# Patient Record
Sex: FEMALE | Race: WHITE | NOT HISPANIC OR LATINO | Employment: OTHER | URBAN - METROPOLITAN AREA
[De-identification: names, ages, dates, MRNs, and addresses within clinical notes are randomized per-mention and may not be internally consistent; named-entity substitution may affect disease eponyms.]

---

## 2017-01-03 ENCOUNTER — COMMUNICATION - HEALTHEAST (OUTPATIENT)
Dept: ONCOLOGY | Facility: CLINIC | Age: 67
End: 2017-01-03

## 2017-01-03 ENCOUNTER — OFFICE VISIT - HEALTHEAST (OUTPATIENT)
Dept: INTERNAL MEDICINE | Facility: CLINIC | Age: 67
End: 2017-01-03

## 2017-01-03 DIAGNOSIS — Z94.84 STEM CELLS TRANSPLANT STATUS (H): ICD-10-CM

## 2017-01-03 DIAGNOSIS — Z78.0 POST-MENOPAUSAL: ICD-10-CM

## 2017-01-03 DIAGNOSIS — I10 ESSENTIAL HYPERTENSION: ICD-10-CM

## 2017-01-03 DIAGNOSIS — E78.5 HLD (HYPERLIPIDEMIA): ICD-10-CM

## 2017-01-03 DIAGNOSIS — I10 HYPERTENSION: ICD-10-CM

## 2017-01-03 DIAGNOSIS — Z12.31 VISIT FOR SCREENING MAMMOGRAM: ICD-10-CM

## 2017-01-03 DIAGNOSIS — C83.390 PRIMARY CNS LYMPHOMA: ICD-10-CM

## 2017-01-04 ENCOUNTER — COMMUNICATION - HEALTHEAST (OUTPATIENT)
Dept: INTERNAL MEDICINE | Facility: CLINIC | Age: 67
End: 2017-01-04

## 2017-01-04 ENCOUNTER — COMMUNICATION - HEALTHEAST (OUTPATIENT)
Dept: ONCOLOGY | Facility: CLINIC | Age: 67
End: 2017-01-04

## 2017-01-06 ENCOUNTER — COMMUNICATION - HEALTHEAST (OUTPATIENT)
Dept: ONCOLOGY | Facility: CLINIC | Age: 67
End: 2017-01-06

## 2017-01-10 ENCOUNTER — RECORDS - HEALTHEAST (OUTPATIENT)
Dept: ADMINISTRATIVE | Facility: OTHER | Age: 67
End: 2017-01-10

## 2017-01-10 ENCOUNTER — HOSPITAL ENCOUNTER (OUTPATIENT)
Dept: MAMMOGRAPHY | Facility: CLINIC | Age: 67
Discharge: HOME | End: 2017-01-10

## 2017-01-10 ENCOUNTER — RECORDS - HEALTHEAST (OUTPATIENT)
Dept: BONE DENSITY | Facility: CLINIC | Age: 67
End: 2017-01-10

## 2017-01-10 DIAGNOSIS — Z12.31 VISIT FOR SCREENING MAMMOGRAM: ICD-10-CM

## 2017-01-10 DIAGNOSIS — Z78.0 ASYMPTOMATIC MENOPAUSAL STATE: ICD-10-CM

## 2017-01-16 ENCOUNTER — COMMUNICATION - HEALTHEAST (OUTPATIENT)
Dept: INTERNAL MEDICINE | Facility: CLINIC | Age: 67
End: 2017-01-16

## 2017-02-07 ENCOUNTER — OFFICE VISIT - HEALTHEAST (OUTPATIENT)
Dept: ONCOLOGY | Facility: CLINIC | Age: 67
End: 2017-02-07

## 2017-02-07 ENCOUNTER — AMBULATORY - HEALTHEAST (OUTPATIENT)
Dept: ONCOLOGY | Facility: CLINIC | Age: 67
End: 2017-02-07

## 2017-02-07 ENCOUNTER — HOSPITAL ENCOUNTER (OUTPATIENT)
Dept: RADIOLOGY | Facility: CLINIC | Age: 67
Discharge: HOME | End: 2017-02-07
Attending: INTERNAL MEDICINE

## 2017-02-07 DIAGNOSIS — C83.390 PRIMARY CNS LYMPHOMA: ICD-10-CM

## 2017-02-07 ASSESSMENT — MIFFLIN-ST. JEOR: SCORE: 1435.08

## 2017-02-14 ENCOUNTER — HOSPITAL ENCOUNTER (OUTPATIENT)
Dept: MRI IMAGING | Facility: CLINIC | Age: 67
Discharge: HOME | End: 2017-02-14
Attending: INTERNAL MEDICINE

## 2017-02-14 ENCOUNTER — INFUSION - HEALTHEAST (OUTPATIENT)
Dept: INFUSION THERAPY | Facility: CLINIC | Age: 67
End: 2017-02-14

## 2017-02-14 DIAGNOSIS — C83.390 PRIMARY CNS LYMPHOMA: ICD-10-CM

## 2017-02-21 ENCOUNTER — OFFICE VISIT - HEALTHEAST (OUTPATIENT)
Dept: VASCULAR SURGERY | Facility: CLINIC | Age: 67
End: 2017-02-21

## 2017-02-21 ENCOUNTER — AMBULATORY - HEALTHEAST (OUTPATIENT)
Dept: ONCOLOGY | Facility: CLINIC | Age: 67
End: 2017-02-21

## 2017-02-21 ENCOUNTER — RECORDS - HEALTHEAST (OUTPATIENT)
Dept: VASCULAR ULTRASOUND | Facility: CLINIC | Age: 67
End: 2017-02-21

## 2017-02-21 DIAGNOSIS — I83.893 SYMPTOMATIC VARICOSE VEINS OF BOTH LOWER EXTREMITIES: ICD-10-CM

## 2017-02-21 DIAGNOSIS — I87.2 VENOUS INSUFFICIENCY OF BOTH LOWER EXTREMITIES: ICD-10-CM

## 2017-02-21 DIAGNOSIS — I83.893 VARICOSE VEINS OF BILATERAL LOWER EXTREMITIES WITH OTHER COMPLICATIONS: ICD-10-CM

## 2017-02-21 DIAGNOSIS — I87.2 VENOUS INSUFFICIENCY (CHRONIC) (PERIPHERAL): ICD-10-CM

## 2017-02-21 ASSESSMENT — MIFFLIN-ST. JEOR: SCORE: 1432.81

## 2017-02-28 ENCOUNTER — AMBULATORY - HEALTHEAST (OUTPATIENT)
Dept: VASCULAR SURGERY | Facility: CLINIC | Age: 67
End: 2017-02-28

## 2017-04-03 ENCOUNTER — COMMUNICATION - HEALTHEAST (OUTPATIENT)
Dept: INTERNAL MEDICINE | Facility: CLINIC | Age: 67
End: 2017-04-03

## 2017-04-04 ENCOUNTER — COMMUNICATION - HEALTHEAST (OUTPATIENT)
Dept: ONCOLOGY | Facility: CLINIC | Age: 67
End: 2017-04-04

## 2017-04-13 ENCOUNTER — COMMUNICATION - HEALTHEAST (OUTPATIENT)
Dept: INTERNAL MEDICINE | Facility: CLINIC | Age: 67
End: 2017-04-13

## 2017-04-13 ENCOUNTER — OFFICE VISIT - HEALTHEAST (OUTPATIENT)
Dept: INTERNAL MEDICINE | Facility: CLINIC | Age: 67
End: 2017-04-13

## 2017-04-13 DIAGNOSIS — N32.81 OAB (OVERACTIVE BLADDER): ICD-10-CM

## 2017-04-13 DIAGNOSIS — M54.50 LOW BACK PAIN: ICD-10-CM

## 2017-04-13 DIAGNOSIS — I87.2 VENOUS INSUFFICIENCY: ICD-10-CM

## 2017-06-06 ENCOUNTER — OFFICE VISIT - HEALTHEAST (OUTPATIENT)
Dept: VASCULAR SURGERY | Facility: CLINIC | Age: 67
End: 2017-06-06

## 2017-06-06 DIAGNOSIS — I87.2 VENOUS INSUFFICIENCY OF BOTH LOWER EXTREMITIES: ICD-10-CM

## 2017-06-06 DIAGNOSIS — I83.893 SYMPTOMATIC VARICOSE VEINS OF BOTH LOWER EXTREMITIES: ICD-10-CM

## 2017-06-12 ENCOUNTER — AMBULATORY - HEALTHEAST (OUTPATIENT)
Dept: VASCULAR SURGERY | Facility: CLINIC | Age: 67
End: 2017-06-12

## 2017-07-10 ENCOUNTER — OFFICE VISIT - HEALTHEAST (OUTPATIENT)
Dept: INTERNAL MEDICINE | Facility: CLINIC | Age: 67
End: 2017-07-10

## 2017-07-10 DIAGNOSIS — I87.2 VENOUS INSUFFICIENCY: ICD-10-CM

## 2017-07-10 DIAGNOSIS — I10 ESSENTIAL HYPERTENSION: ICD-10-CM

## 2017-07-10 DIAGNOSIS — K21.9 ESOPHAGEAL REFLUX: ICD-10-CM

## 2017-07-10 DIAGNOSIS — N32.81 OAB (OVERACTIVE BLADDER): ICD-10-CM

## 2017-07-10 DIAGNOSIS — R05.3 CHRONIC COUGH: ICD-10-CM

## 2017-07-10 DIAGNOSIS — C83.390 PRIMARY CNS LYMPHOMA: ICD-10-CM

## 2017-08-08 ENCOUNTER — OFFICE VISIT - HEALTHEAST (OUTPATIENT)
Dept: INTERNAL MEDICINE | Facility: CLINIC | Age: 67
End: 2017-08-08

## 2017-08-08 ENCOUNTER — OFFICE VISIT - HEALTHEAST (OUTPATIENT)
Dept: ONCOLOGY | Facility: CLINIC | Age: 67
End: 2017-08-08

## 2017-08-08 DIAGNOSIS — N18.2 CHRONIC KIDNEY DISEASE, STAGE II (MILD): ICD-10-CM

## 2017-08-08 DIAGNOSIS — C83.390 PRIMARY CNS LYMPHOMA: ICD-10-CM

## 2017-08-08 DIAGNOSIS — I10 ESSENTIAL HYPERTENSION: ICD-10-CM

## 2017-08-08 DIAGNOSIS — R05.9 COUGH: ICD-10-CM

## 2017-08-09 ENCOUNTER — HOSPITAL ENCOUNTER (OUTPATIENT)
Dept: MRI IMAGING | Facility: CLINIC | Age: 67
Setting detail: RADIATION/ONCOLOGY SERIES
Discharge: STILL ADMITTED OR ADMINISTRATIVE | End: 2017-08-09
Attending: INTERNAL MEDICINE

## 2017-08-09 DIAGNOSIS — C83.390 PRIMARY CNS LYMPHOMA: ICD-10-CM

## 2017-08-11 ENCOUNTER — COMMUNICATION - HEALTHEAST (OUTPATIENT)
Dept: INTERNAL MEDICINE | Facility: CLINIC | Age: 67
End: 2017-08-11

## 2017-12-07 ENCOUNTER — OFFICE VISIT - HEALTHEAST (OUTPATIENT)
Dept: INTERNAL MEDICINE | Facility: CLINIC | Age: 67
End: 2017-12-07

## 2017-12-07 DIAGNOSIS — R05.9 COUGH: ICD-10-CM

## 2017-12-07 DIAGNOSIS — J40 BRONCHITIS: ICD-10-CM

## 2018-02-01 ENCOUNTER — HOSPITAL ENCOUNTER (OUTPATIENT)
Dept: MRI IMAGING | Facility: CLINIC | Age: 68
Setting detail: RADIATION/ONCOLOGY SERIES
Discharge: STILL ADMITTED OR ADMINISTRATIVE | End: 2018-02-01
Attending: INTERNAL MEDICINE

## 2018-02-01 ENCOUNTER — AMBULATORY - HEALTHEAST (OUTPATIENT)
Dept: INFUSION THERAPY | Facility: CLINIC | Age: 68
End: 2018-02-01

## 2018-02-01 DIAGNOSIS — C83.390 PRIMARY CNS LYMPHOMA: ICD-10-CM

## 2018-02-01 LAB
ALBUMIN SERPL-MCNC: 3.7 G/DL (ref 3.5–5)
ALP SERPL-CCNC: 93 U/L (ref 45–120)
ALT SERPL W P-5'-P-CCNC: 14 U/L (ref 0–45)
ANION GAP SERPL CALCULATED.3IONS-SCNC: 12 MMOL/L (ref 5–18)
AST SERPL W P-5'-P-CCNC: 18 U/L (ref 0–40)
BASOPHILS # BLD AUTO: 0 THOU/UL (ref 0–0.2)
BASOPHILS NFR BLD AUTO: 1 % (ref 0–2)
BILIRUB SERPL-MCNC: 0.5 MG/DL (ref 0–1)
BUN SERPL-MCNC: 22 MG/DL (ref 8–22)
CALCIUM SERPL-MCNC: 9.7 MG/DL (ref 8.5–10.5)
CHLORIDE BLD-SCNC: 102 MMOL/L (ref 98–107)
CO2 SERPL-SCNC: 29 MMOL/L (ref 22–31)
CREAT SERPL-MCNC: 1.2 MG/DL (ref 0.6–1.1)
EOSINOPHIL # BLD AUTO: 0.1 THOU/UL (ref 0–0.4)
EOSINOPHIL NFR BLD AUTO: 2 % (ref 0–6)
ERYTHROCYTE [DISTWIDTH] IN BLOOD BY AUTOMATED COUNT: 13.2 % (ref 11–14.5)
GFR SERPL CREATININE-BSD FRML MDRD: 45 ML/MIN/1.73M2
GLUCOSE BLD-MCNC: 120 MG/DL (ref 70–125)
HCT VFR BLD AUTO: 43.4 % (ref 35–47)
HGB BLD-MCNC: 14.2 G/DL (ref 12–16)
LYMPHOCYTES # BLD AUTO: 1.3 THOU/UL (ref 0.8–4.4)
LYMPHOCYTES NFR BLD AUTO: 18 % (ref 20–40)
MCH RBC QN AUTO: 30.7 PG (ref 27–34)
MCHC RBC AUTO-ENTMCNC: 32.7 G/DL (ref 32–36)
MCV RBC AUTO: 94 FL (ref 80–100)
MONOCYTES # BLD AUTO: 0.4 THOU/UL (ref 0–0.9)
MONOCYTES NFR BLD AUTO: 5 % (ref 2–10)
NEUTROPHILS # BLD AUTO: 5.4 THOU/UL (ref 2–7.7)
NEUTROPHILS NFR BLD AUTO: 74 % (ref 50–70)
PLATELET # BLD AUTO: 322 THOU/UL (ref 140–440)
PMV BLD AUTO: 9.3 FL (ref 8.5–12.5)
POTASSIUM BLD-SCNC: 3.6 MMOL/L (ref 3.5–5)
PROT SERPL-MCNC: 7.9 G/DL (ref 6–8)
RBC # BLD AUTO: 4.63 MILL/UL (ref 3.8–5.4)
SODIUM SERPL-SCNC: 143 MMOL/L (ref 136–145)
WBC: 7.3 THOU/UL (ref 4–11)

## 2018-02-06 ENCOUNTER — OFFICE VISIT - HEALTHEAST (OUTPATIENT)
Dept: ONCOLOGY | Facility: CLINIC | Age: 68
End: 2018-02-06

## 2018-02-06 DIAGNOSIS — C83.390 PRIMARY CNS LYMPHOMA: ICD-10-CM

## 2018-02-06 ASSESSMENT — MIFFLIN-ST. JEOR: SCORE: 1410.13

## 2018-02-08 ENCOUNTER — OFFICE VISIT - HEALTHEAST (OUTPATIENT)
Dept: INTERNAL MEDICINE | Facility: CLINIC | Age: 68
End: 2018-02-08

## 2018-02-08 DIAGNOSIS — I10 ESSENTIAL HYPERTENSION: ICD-10-CM

## 2018-02-08 DIAGNOSIS — E78.5 HLD (HYPERLIPIDEMIA): ICD-10-CM

## 2018-02-08 DIAGNOSIS — C83.390 PRIMARY CNS LYMPHOMA: ICD-10-CM

## 2018-02-08 DIAGNOSIS — I87.2 VENOUS INSUFFICIENCY: ICD-10-CM

## 2018-02-08 LAB
CHOLEST SERPL-MCNC: 182 MG/DL
FASTING STATUS PATIENT QL REPORTED: YES
HDLC SERPL-MCNC: 60 MG/DL
LDLC SERPL CALC-MCNC: 102 MG/DL
TRIGL SERPL-MCNC: 101 MG/DL

## 2018-02-12 ENCOUNTER — COMMUNICATION - HEALTHEAST (OUTPATIENT)
Dept: ONCOLOGY | Facility: CLINIC | Age: 68
End: 2018-02-12

## 2018-02-14 ENCOUNTER — COMMUNICATION - HEALTHEAST (OUTPATIENT)
Dept: INTERNAL MEDICINE | Facility: CLINIC | Age: 68
End: 2018-02-14

## 2018-02-14 LAB — GLUCOSE BLD-MCNC: 87 MG/DL (ref 70–125)

## 2018-05-24 ENCOUNTER — OFFICE VISIT - HEALTHEAST (OUTPATIENT)
Dept: INTERNAL MEDICINE | Facility: CLINIC | Age: 68
End: 2018-05-24

## 2018-05-24 DIAGNOSIS — J32.9 RHINOSINUSITIS: ICD-10-CM

## 2018-05-24 DIAGNOSIS — J40 BRONCHITIS: ICD-10-CM

## 2018-06-05 ENCOUNTER — COMMUNICATION - HEALTHEAST (OUTPATIENT)
Dept: VASCULAR SURGERY | Facility: CLINIC | Age: 68
End: 2018-06-05

## 2018-08-02 ENCOUNTER — AMBULATORY - HEALTHEAST (OUTPATIENT)
Dept: INFUSION THERAPY | Facility: CLINIC | Age: 68
End: 2018-08-02

## 2018-08-02 ENCOUNTER — HOSPITAL ENCOUNTER (OUTPATIENT)
Dept: MRI IMAGING | Facility: CLINIC | Age: 68
Discharge: HOME | End: 2018-08-02
Attending: INTERNAL MEDICINE

## 2018-08-02 DIAGNOSIS — C83.390 PRIMARY CNS LYMPHOMA: ICD-10-CM

## 2018-08-02 LAB
ALBUMIN SERPL-MCNC: 3.6 G/DL (ref 3.5–5)
ALP SERPL-CCNC: 96 U/L (ref 45–120)
ALT SERPL W P-5'-P-CCNC: 15 U/L (ref 0–45)
ANION GAP SERPL CALCULATED.3IONS-SCNC: 8 MMOL/L (ref 5–18)
AST SERPL W P-5'-P-CCNC: 20 U/L (ref 0–40)
BASOPHILS # BLD AUTO: 0 THOU/UL (ref 0–0.2)
BASOPHILS NFR BLD AUTO: 0 % (ref 0–2)
BILIRUB SERPL-MCNC: 0.6 MG/DL (ref 0–1)
BUN SERPL-MCNC: 22 MG/DL (ref 8–28)
CALCIUM SERPL-MCNC: 10.3 MG/DL (ref 8.5–10.5)
CHLORIDE BLD-SCNC: 101 MMOL/L (ref 98–107)
CO2 SERPL-SCNC: 32 MMOL/L (ref 22–31)
CREAT SERPL-MCNC: 1.24 MG/DL (ref 0.6–1.1)
EOSINOPHIL # BLD AUTO: 0.2 THOU/UL (ref 0–0.4)
EOSINOPHIL NFR BLD AUTO: 3 % (ref 0–6)
ERYTHROCYTE [DISTWIDTH] IN BLOOD BY AUTOMATED COUNT: 13.3 % (ref 11–14.5)
GFR SERPL CREATININE-BSD FRML MDRD: 43 ML/MIN/1.73M2
GLUCOSE BLD-MCNC: 110 MG/DL (ref 70–125)
HCT VFR BLD AUTO: 38.1 % (ref 35–47)
HGB BLD-MCNC: 12.5 G/DL (ref 12–16)
LYMPHOCYTES # BLD AUTO: 1.4 THOU/UL (ref 0.8–4.4)
LYMPHOCYTES NFR BLD AUTO: 20 % (ref 20–40)
MCH RBC QN AUTO: 31.1 PG (ref 27–34)
MCHC RBC AUTO-ENTMCNC: 32.8 G/DL (ref 32–36)
MCV RBC AUTO: 95 FL (ref 80–100)
MONOCYTES # BLD AUTO: 0.4 THOU/UL (ref 0–0.9)
MONOCYTES NFR BLD AUTO: 6 % (ref 2–10)
NEUTROPHILS # BLD AUTO: 5 THOU/UL (ref 2–7.7)
NEUTROPHILS NFR BLD AUTO: 71 % (ref 50–70)
PLATELET # BLD AUTO: 290 THOU/UL (ref 140–440)
PMV BLD AUTO: 8.8 FL (ref 8.5–12.5)
POTASSIUM BLD-SCNC: 3.7 MMOL/L (ref 3.5–5)
PROT SERPL-MCNC: 7.3 G/DL (ref 6–8)
RBC # BLD AUTO: 4.02 MILL/UL (ref 3.8–5.4)
SODIUM SERPL-SCNC: 141 MMOL/L (ref 136–145)
WBC: 7 THOU/UL (ref 4–11)

## 2018-08-07 ENCOUNTER — OFFICE VISIT - HEALTHEAST (OUTPATIENT)
Dept: ONCOLOGY | Facility: CLINIC | Age: 68
End: 2018-08-07

## 2018-08-07 DIAGNOSIS — C83.390 PRIMARY CNS LYMPHOMA: ICD-10-CM

## 2018-08-07 ASSESSMENT — MIFFLIN-ST. JEOR: SCORE: 1421.47

## 2018-08-08 ENCOUNTER — OFFICE VISIT - HEALTHEAST (OUTPATIENT)
Dept: INTERNAL MEDICINE | Facility: CLINIC | Age: 68
End: 2018-08-08

## 2018-08-08 DIAGNOSIS — C83.390 PRIMARY CNS LYMPHOMA: ICD-10-CM

## 2018-08-08 DIAGNOSIS — I87.2 VENOUS INSUFFICIENCY: ICD-10-CM

## 2018-08-08 DIAGNOSIS — N32.81 OAB (OVERACTIVE BLADDER): ICD-10-CM

## 2018-08-08 DIAGNOSIS — I10 ESSENTIAL HYPERTENSION: ICD-10-CM

## 2018-08-08 DIAGNOSIS — E78.5 HLD (HYPERLIPIDEMIA): ICD-10-CM

## 2018-09-04 ENCOUNTER — COMMUNICATION - HEALTHEAST (OUTPATIENT)
Dept: INTERNAL MEDICINE | Facility: CLINIC | Age: 68
End: 2018-09-04

## 2018-09-04 ENCOUNTER — RECORDS - HEALTHEAST (OUTPATIENT)
Dept: ADMINISTRATIVE | Facility: OTHER | Age: 68
End: 2018-09-04

## 2018-09-12 ENCOUNTER — AMBULATORY - HEALTHEAST (OUTPATIENT)
Dept: VASCULAR SURGERY | Facility: CLINIC | Age: 68
End: 2018-09-12

## 2018-09-13 ENCOUNTER — AMBULATORY - HEALTHEAST (OUTPATIENT)
Dept: VASCULAR SURGERY | Facility: CLINIC | Age: 68
End: 2018-09-13

## 2018-09-13 DIAGNOSIS — I83.892 SYMPTOMATIC VARICOSE VEINS OF LEFT LOWER EXTREMITY: ICD-10-CM

## 2018-09-13 DIAGNOSIS — I87.2 VENOUS INSUFFICIENCY OF LEFT LOWER EXTREMITY: ICD-10-CM

## 2018-09-14 ENCOUNTER — COMMUNICATION - HEALTHEAST (OUTPATIENT)
Dept: VASCULAR SURGERY | Facility: CLINIC | Age: 68
End: 2018-09-14

## 2018-09-18 ENCOUNTER — OFFICE VISIT - HEALTHEAST (OUTPATIENT)
Dept: INTERNAL MEDICINE | Facility: CLINIC | Age: 68
End: 2018-09-18

## 2018-09-18 DIAGNOSIS — I10 ESSENTIAL HYPERTENSION: ICD-10-CM

## 2018-09-18 DIAGNOSIS — I87.2 VENOUS INSUFFICIENCY: ICD-10-CM

## 2018-09-18 DIAGNOSIS — C83.390 PRIMARY CNS LYMPHOMA: ICD-10-CM

## 2018-09-18 DIAGNOSIS — N18.2 CHRONIC KIDNEY DISEASE, STAGE II (MILD): ICD-10-CM

## 2018-09-18 DIAGNOSIS — J44.9 COPD (CHRONIC OBSTRUCTIVE PULMONARY DISEASE) (H): ICD-10-CM

## 2018-09-18 DIAGNOSIS — H35.341 MACULAR HOLE OF RIGHT EYE: ICD-10-CM

## 2018-09-18 DIAGNOSIS — E78.5 HLD (HYPERLIPIDEMIA): ICD-10-CM

## 2018-09-18 DIAGNOSIS — Z01.818 PREOPERATIVE EXAMINATION: ICD-10-CM

## 2018-09-18 DIAGNOSIS — K21.9 ESOPHAGEAL REFLUX: ICD-10-CM

## 2018-09-18 ASSESSMENT — MIFFLIN-ST. JEOR: SCORE: 1410.13

## 2018-10-29 ENCOUNTER — RECORDS - HEALTHEAST (OUTPATIENT)
Dept: ADMINISTRATIVE | Facility: OTHER | Age: 68
End: 2018-10-29

## 2018-11-06 ENCOUNTER — COMMUNICATION - HEALTHEAST (OUTPATIENT)
Dept: VASCULAR SURGERY | Facility: CLINIC | Age: 68
End: 2018-11-06

## 2019-01-21 ENCOUNTER — RECORDS - HEALTHEAST (OUTPATIENT)
Dept: ADMINISTRATIVE | Facility: OTHER | Age: 69
End: 2019-01-21

## 2019-02-07 ENCOUNTER — HOSPITAL ENCOUNTER (OUTPATIENT)
Dept: MRI IMAGING | Facility: CLINIC | Age: 69
Setting detail: RADIATION/ONCOLOGY SERIES
Discharge: STILL ADMITTED OR ADMINISTRATIVE | End: 2019-02-07
Attending: INTERNAL MEDICINE

## 2019-02-07 ENCOUNTER — COMMUNICATION - HEALTHEAST (OUTPATIENT)
Dept: INTERNAL MEDICINE | Facility: CLINIC | Age: 69
End: 2019-02-07

## 2019-02-07 DIAGNOSIS — C83.390 PRIMARY CNS LYMPHOMA: ICD-10-CM

## 2019-02-07 DIAGNOSIS — I10 ESSENTIAL HYPERTENSION: ICD-10-CM

## 2019-02-07 LAB — CREAT BLD-MCNC: 1.1 MG/DL

## 2019-02-12 ENCOUNTER — OFFICE VISIT - HEALTHEAST (OUTPATIENT)
Dept: ONCOLOGY | Facility: CLINIC | Age: 69
End: 2019-02-12

## 2019-02-12 DIAGNOSIS — C83.390 PRIMARY CNS LYMPHOMA: ICD-10-CM

## 2019-02-14 ENCOUNTER — OFFICE VISIT - HEALTHEAST (OUTPATIENT)
Dept: INTERNAL MEDICINE | Facility: CLINIC | Age: 69
End: 2019-02-14

## 2019-02-14 ENCOUNTER — AMBULATORY - HEALTHEAST (OUTPATIENT)
Dept: LAB | Facility: CLINIC | Age: 69
End: 2019-02-14

## 2019-02-14 DIAGNOSIS — R05.9 COUGH: ICD-10-CM

## 2019-02-14 DIAGNOSIS — C83.390 PRIMARY CNS LYMPHOMA: ICD-10-CM

## 2019-02-14 DIAGNOSIS — M25.512 ACUTE PAIN OF LEFT SHOULDER: ICD-10-CM

## 2019-02-14 DIAGNOSIS — I51.7 HEART ENLARGED: ICD-10-CM

## 2019-02-14 DIAGNOSIS — E78.2 MIXED HYPERLIPIDEMIA: ICD-10-CM

## 2019-02-14 LAB
ALBUMIN SERPL-MCNC: 3.8 G/DL (ref 3.5–5)
ALP SERPL-CCNC: 92 U/L (ref 45–120)
ALT SERPL W P-5'-P-CCNC: 12 U/L (ref 0–45)
ANION GAP SERPL CALCULATED.3IONS-SCNC: 9 MMOL/L (ref 5–18)
AST SERPL W P-5'-P-CCNC: 20 U/L (ref 0–40)
BASOPHILS # BLD AUTO: 0 THOU/UL (ref 0–0.2)
BASOPHILS NFR BLD AUTO: 0 % (ref 0–2)
BILIRUB SERPL-MCNC: 0.5 MG/DL (ref 0–1)
BNP SERPL-MCNC: 44 PG/ML (ref 0–120)
BUN SERPL-MCNC: 29 MG/DL (ref 8–28)
CALCIUM SERPL-MCNC: 9.9 MG/DL (ref 8.5–10.5)
CHLORIDE BLD-SCNC: 102 MMOL/L (ref 98–107)
CHOLEST SERPL-MCNC: 184 MG/DL
CO2 SERPL-SCNC: 29 MMOL/L (ref 22–31)
CREAT SERPL-MCNC: 1.22 MG/DL (ref 0.6–1.1)
EOSINOPHIL # BLD AUTO: 0.2 THOU/UL (ref 0–0.4)
EOSINOPHIL NFR BLD AUTO: 3 % (ref 0–6)
ERYTHROCYTE [DISTWIDTH] IN BLOOD BY AUTOMATED COUNT: 11.4 % (ref 11–14.5)
FASTING STATUS PATIENT QL REPORTED: NORMAL
GFR SERPL CREATININE-BSD FRML MDRD: 44 ML/MIN/1.73M2
GLUCOSE BLD-MCNC: 99 MG/DL (ref 70–125)
HCT VFR BLD AUTO: 32.1 % (ref 35–47)
HDLC SERPL-MCNC: 63 MG/DL
HGB BLD-MCNC: 10.7 G/DL (ref 12–16)
LDH SERPL L TO P-CCNC: 152 U/L (ref 125–220)
LDLC SERPL CALC-MCNC: 98 MG/DL
LYMPHOCYTES # BLD AUTO: 1.3 THOU/UL (ref 0.8–4.4)
LYMPHOCYTES NFR BLD AUTO: 18 % (ref 20–40)
MCH RBC QN AUTO: 28.6 PG (ref 27–34)
MCHC RBC AUTO-ENTMCNC: 33.4 G/DL (ref 32–36)
MCV RBC AUTO: 86 FL (ref 80–100)
MONOCYTES # BLD AUTO: 0.5 THOU/UL (ref 0–0.9)
MONOCYTES NFR BLD AUTO: 7 % (ref 2–10)
NEUTROPHILS # BLD AUTO: 5.3 THOU/UL (ref 2–7.7)
NEUTROPHILS NFR BLD AUTO: 72 % (ref 50–70)
PLATELET # BLD AUTO: 428 THOU/UL (ref 140–440)
PMV BLD AUTO: 6.4 FL (ref 7–10)
POTASSIUM BLD-SCNC: 4.1 MMOL/L (ref 3.5–5)
PROT SERPL-MCNC: 7 G/DL (ref 6–8)
RBC # BLD AUTO: 3.75 MILL/UL (ref 3.8–5.4)
SODIUM SERPL-SCNC: 140 MMOL/L (ref 136–145)
TRIGL SERPL-MCNC: 113 MG/DL
WBC: 7.4 THOU/UL (ref 4–11)

## 2019-02-15 ENCOUNTER — COMMUNICATION - HEALTHEAST (OUTPATIENT)
Dept: INTERNAL MEDICINE | Facility: CLINIC | Age: 69
End: 2019-02-15

## 2019-02-15 ENCOUNTER — AMBULATORY - HEALTHEAST (OUTPATIENT)
Dept: INTERNAL MEDICINE | Facility: CLINIC | Age: 69
End: 2019-02-15

## 2019-02-25 ENCOUNTER — OFFICE VISIT - HEALTHEAST (OUTPATIENT)
Dept: PHYSICAL THERAPY | Facility: REHABILITATION | Age: 69
End: 2019-02-25

## 2019-02-25 DIAGNOSIS — G89.29 CHRONIC LEFT SHOULDER PAIN: ICD-10-CM

## 2019-02-25 DIAGNOSIS — M25.512 CHRONIC LEFT SHOULDER PAIN: ICD-10-CM

## 2019-02-25 DIAGNOSIS — M62.81 GENERALIZED MUSCLE WEAKNESS: ICD-10-CM

## 2019-03-01 ENCOUNTER — OFFICE VISIT - HEALTHEAST (OUTPATIENT)
Dept: PHYSICAL THERAPY | Facility: REHABILITATION | Age: 69
End: 2019-03-01

## 2019-03-01 DIAGNOSIS — M25.512 CHRONIC LEFT SHOULDER PAIN: ICD-10-CM

## 2019-03-01 DIAGNOSIS — M62.81 GENERALIZED MUSCLE WEAKNESS: ICD-10-CM

## 2019-03-01 DIAGNOSIS — G89.29 CHRONIC LEFT SHOULDER PAIN: ICD-10-CM

## 2019-03-08 ENCOUNTER — OFFICE VISIT - HEALTHEAST (OUTPATIENT)
Dept: PHYSICAL THERAPY | Facility: REHABILITATION | Age: 69
End: 2019-03-08

## 2019-03-08 DIAGNOSIS — M25.512 CHRONIC LEFT SHOULDER PAIN: ICD-10-CM

## 2019-03-08 DIAGNOSIS — M62.81 GENERALIZED MUSCLE WEAKNESS: ICD-10-CM

## 2019-03-08 DIAGNOSIS — G89.29 CHRONIC LEFT SHOULDER PAIN: ICD-10-CM

## 2019-03-18 ENCOUNTER — OFFICE VISIT - HEALTHEAST (OUTPATIENT)
Dept: PHYSICAL THERAPY | Facility: REHABILITATION | Age: 69
End: 2019-03-18

## 2019-03-18 DIAGNOSIS — M25.512 CHRONIC LEFT SHOULDER PAIN: ICD-10-CM

## 2019-03-18 DIAGNOSIS — G89.29 CHRONIC LEFT SHOULDER PAIN: ICD-10-CM

## 2019-03-18 DIAGNOSIS — M62.81 GENERALIZED MUSCLE WEAKNESS: ICD-10-CM

## 2019-03-22 ENCOUNTER — COMMUNICATION - HEALTHEAST (OUTPATIENT)
Dept: INTERNAL MEDICINE | Facility: CLINIC | Age: 69
End: 2019-03-22

## 2019-03-22 DIAGNOSIS — E78.5 HLD (HYPERLIPIDEMIA): ICD-10-CM

## 2019-03-22 DIAGNOSIS — I10 ESSENTIAL HYPERTENSION: ICD-10-CM

## 2019-03-22 DIAGNOSIS — K21.9 ESOPHAGEAL REFLUX: ICD-10-CM

## 2019-06-18 ENCOUNTER — COMMUNICATION - HEALTHEAST (OUTPATIENT)
Dept: SCHEDULING | Facility: CLINIC | Age: 69
End: 2019-06-18

## 2019-06-18 ENCOUNTER — OFFICE VISIT - HEALTHEAST (OUTPATIENT)
Dept: FAMILY MEDICINE | Facility: CLINIC | Age: 69
End: 2019-06-18

## 2019-06-18 DIAGNOSIS — J32.0 LEFT MAXILLARY SINUSITIS: ICD-10-CM

## 2019-06-25 ENCOUNTER — OFFICE VISIT - HEALTHEAST (OUTPATIENT)
Dept: FAMILY MEDICINE | Facility: CLINIC | Age: 69
End: 2019-06-25

## 2019-06-25 DIAGNOSIS — Z12.31 VISIT FOR SCREENING MAMMOGRAM: ICD-10-CM

## 2019-06-25 DIAGNOSIS — K13.79 LUMP IN MOUTH: ICD-10-CM

## 2019-07-04 ENCOUNTER — OFFICE VISIT - HEALTHEAST (OUTPATIENT)
Dept: FAMILY MEDICINE | Facility: CLINIC | Age: 69
End: 2019-07-04

## 2019-07-04 ENCOUNTER — HOSPITAL ENCOUNTER (OUTPATIENT)
Dept: CT IMAGING | Facility: HOSPITAL | Age: 69
Discharge: HOME | End: 2019-07-04

## 2019-07-04 DIAGNOSIS — K04.7 DENTAL ABSCESS: ICD-10-CM

## 2019-07-04 DIAGNOSIS — J34.89 SINUS PAIN: ICD-10-CM

## 2019-08-12 ENCOUNTER — HOSPITAL ENCOUNTER (OUTPATIENT)
Dept: MRI IMAGING | Facility: CLINIC | Age: 69
Setting detail: RADIATION/ONCOLOGY SERIES
Discharge: STILL ADMITTED OR ADMINISTRATIVE | End: 2019-08-12
Attending: INTERNAL MEDICINE

## 2019-08-12 DIAGNOSIS — C83.390 PRIMARY CNS LYMPHOMA: ICD-10-CM

## 2019-08-12 LAB
CREAT BLD-MCNC: 1.5 MG/DL (ref 0.6–1.1)
GFR SERPL CREATININE-BSD FRML MDRD: 34 ML/MIN/1.73M2

## 2019-08-15 ENCOUNTER — AMBULATORY - HEALTHEAST (OUTPATIENT)
Dept: INFUSION THERAPY | Facility: CLINIC | Age: 69
End: 2019-08-15

## 2019-08-15 ENCOUNTER — OFFICE VISIT - HEALTHEAST (OUTPATIENT)
Dept: ONCOLOGY | Facility: CLINIC | Age: 69
End: 2019-08-15

## 2019-08-15 DIAGNOSIS — D50.0 IRON DEFICIENCY ANEMIA DUE TO CHRONIC BLOOD LOSS: ICD-10-CM

## 2019-08-15 DIAGNOSIS — C83.390 PRIMARY CNS LYMPHOMA: ICD-10-CM

## 2019-08-15 LAB
ALBUMIN SERPL-MCNC: 3.6 G/DL (ref 3.5–5)
ALP SERPL-CCNC: 104 U/L (ref 45–120)
ALT SERPL W P-5'-P-CCNC: 10 U/L (ref 0–45)
ANION GAP SERPL CALCULATED.3IONS-SCNC: 10 MMOL/L (ref 5–18)
AST SERPL W P-5'-P-CCNC: 16 U/L (ref 0–40)
BASOPHILS # BLD AUTO: 0 THOU/UL (ref 0–0.2)
BASOPHILS NFR BLD AUTO: 1 % (ref 0–2)
BILIRUB SERPL-MCNC: 0.3 MG/DL (ref 0–1)
BUN SERPL-MCNC: 23 MG/DL (ref 8–28)
CALCIUM SERPL-MCNC: 9.8 MG/DL (ref 8.5–10.5)
CHLORIDE BLD-SCNC: 103 MMOL/L (ref 98–107)
CO2 SERPL-SCNC: 26 MMOL/L (ref 22–31)
CREAT SERPL-MCNC: 1.63 MG/DL (ref 0.6–1.1)
EOSINOPHIL # BLD AUTO: 0.2 THOU/UL (ref 0–0.4)
EOSINOPHIL NFR BLD AUTO: 2 % (ref 0–6)
ERYTHROCYTE [DISTWIDTH] IN BLOOD BY AUTOMATED COUNT: 14.1 % (ref 11–14.5)
FERRITIN SERPL-MCNC: 7 NG/ML (ref 10–130)
GFR SERPL CREATININE-BSD FRML MDRD: 31 ML/MIN/1.73M2
GLUCOSE BLD-MCNC: 147 MG/DL (ref 70–125)
HCT VFR BLD AUTO: 28.8 % (ref 35–47)
HGB BLD-MCNC: 8.8 G/DL (ref 12–16)
IRON SATN MFR SERPL: 43 % (ref 20–50)
IRON SERPL-MCNC: 199 UG/DL (ref 42–175)
LDH SERPL L TO P-CCNC: 162 U/L (ref 125–220)
LYMPHOCYTES # BLD AUTO: 1.2 THOU/UL (ref 0.8–4.4)
LYMPHOCYTES NFR BLD AUTO: 19 % (ref 20–40)
MCH RBC QN AUTO: 26.7 PG (ref 27–34)
MCHC RBC AUTO-ENTMCNC: 30.6 G/DL (ref 32–36)
MCV RBC AUTO: 87 FL (ref 80–100)
MONOCYTES # BLD AUTO: 0.3 THOU/UL (ref 0–0.9)
MONOCYTES NFR BLD AUTO: 5 % (ref 2–10)
NEUTROPHILS # BLD AUTO: 4.9 THOU/UL (ref 2–7.7)
NEUTROPHILS NFR BLD AUTO: 74 % (ref 50–70)
PLATELET # BLD AUTO: 438 THOU/UL (ref 140–440)
PMV BLD AUTO: 8.6 FL (ref 8.5–12.5)
POTASSIUM BLD-SCNC: 3 MMOL/L (ref 3.5–5)
PROT SERPL-MCNC: 7.3 G/DL (ref 6–8)
RBC # BLD AUTO: 3.3 MILL/UL (ref 3.8–5.4)
SODIUM SERPL-SCNC: 139 MMOL/L (ref 136–145)
TIBC SERPL-MCNC: 461 UG/DL (ref 313–563)
TRANSFERRIN SERPL-MCNC: 368 MG/DL (ref 212–360)
VIT B12 SERPL-MCNC: 274 PG/ML (ref 213–816)
WBC: 6.6 THOU/UL (ref 4–11)

## 2019-08-16 ENCOUNTER — COMMUNICATION - HEALTHEAST (OUTPATIENT)
Dept: ONCOLOGY | Facility: HOSPITAL | Age: 69
End: 2019-08-16

## 2019-08-16 ENCOUNTER — AMBULATORY - HEALTHEAST (OUTPATIENT)
Dept: ONCOLOGY | Facility: CLINIC | Age: 69
End: 2019-08-16

## 2019-08-16 DIAGNOSIS — E61.1 IRON DEFICIENCY: ICD-10-CM

## 2019-08-16 DIAGNOSIS — T50.905S ADVERSE EFFECT OF DRUG OR MEDICAMENT, SEQUELA: ICD-10-CM

## 2019-08-19 ENCOUNTER — AMBULATORY - HEALTHEAST (OUTPATIENT)
Dept: ONCOLOGY | Facility: CLINIC | Age: 69
End: 2019-08-19

## 2019-08-19 DIAGNOSIS — N18.30 CKD (CHRONIC KIDNEY DISEASE) STAGE 3, GFR 30-59 ML/MIN (H): ICD-10-CM

## 2019-08-20 ENCOUNTER — INFUSION - HEALTHEAST (OUTPATIENT)
Dept: INFUSION THERAPY | Facility: CLINIC | Age: 69
End: 2019-08-20

## 2019-08-20 DIAGNOSIS — E61.1 IRON DEFICIENCY: ICD-10-CM

## 2019-08-20 DIAGNOSIS — N18.30 CKD (CHRONIC KIDNEY DISEASE) STAGE 3, GFR 30-59 ML/MIN (H): ICD-10-CM

## 2019-08-20 DIAGNOSIS — T50.905S ADVERSE EFFECT OF DRUG OR MEDICAMENT, SEQUELA: ICD-10-CM

## 2019-08-20 DIAGNOSIS — D50.0 BLOOD LOSS ANEMIA: ICD-10-CM

## 2019-08-27 ENCOUNTER — INFUSION - HEALTHEAST (OUTPATIENT)
Dept: INFUSION THERAPY | Facility: CLINIC | Age: 69
End: 2019-08-27

## 2019-08-27 DIAGNOSIS — E61.1 IRON DEFICIENCY: ICD-10-CM

## 2019-08-27 DIAGNOSIS — D50.0 BLOOD LOSS ANEMIA: ICD-10-CM

## 2019-08-27 DIAGNOSIS — N18.30 CKD (CHRONIC KIDNEY DISEASE) STAGE 3, GFR 30-59 ML/MIN (H): ICD-10-CM

## 2019-08-27 DIAGNOSIS — T50.905S ADVERSE EFFECT OF DRUG OR MEDICAMENT, SEQUELA: ICD-10-CM

## 2019-11-19 ENCOUNTER — COMMUNICATION - HEALTHEAST (OUTPATIENT)
Dept: INTERNAL MEDICINE | Facility: CLINIC | Age: 69
End: 2019-11-19

## 2019-11-19 DIAGNOSIS — I10 ESSENTIAL HYPERTENSION: ICD-10-CM

## 2020-01-16 ENCOUNTER — AMBULATORY - HEALTHEAST (OUTPATIENT)
Dept: NURSING | Facility: CLINIC | Age: 70
End: 2020-01-16

## 2020-01-16 DIAGNOSIS — Z23 FLU VACCINE NEED: ICD-10-CM

## 2020-01-27 ENCOUNTER — COMMUNICATION - HEALTHEAST (OUTPATIENT)
Dept: FAMILY MEDICINE | Facility: CLINIC | Age: 70
End: 2020-01-27

## 2020-01-27 ENCOUNTER — OFFICE VISIT - HEALTHEAST (OUTPATIENT)
Dept: FAMILY MEDICINE | Facility: CLINIC | Age: 70
End: 2020-01-27

## 2020-01-27 DIAGNOSIS — E78.2 MIXED HYPERLIPIDEMIA: ICD-10-CM

## 2020-01-27 DIAGNOSIS — C83.390 PRIMARY CNS LYMPHOMA: ICD-10-CM

## 2020-01-27 DIAGNOSIS — I87.2 VENOUS INSUFFICIENCY: ICD-10-CM

## 2020-01-27 DIAGNOSIS — I10 ESSENTIAL HYPERTENSION: ICD-10-CM

## 2020-01-27 LAB
ALBUMIN SERPL-MCNC: 3.6 G/DL (ref 3.5–5)
ALP SERPL-CCNC: 100 U/L (ref 45–120)
ALT SERPL W P-5'-P-CCNC: 14 U/L (ref 0–45)
ANION GAP SERPL CALCULATED.3IONS-SCNC: 8 MMOL/L (ref 5–18)
AST SERPL W P-5'-P-CCNC: 19 U/L (ref 0–40)
BASOPHILS # BLD AUTO: 0 THOU/UL (ref 0–0.2)
BASOPHILS NFR BLD AUTO: 0 % (ref 0–2)
BILIRUB SERPL-MCNC: 0.5 MG/DL (ref 0–1)
BUN SERPL-MCNC: 18 MG/DL (ref 8–28)
CALCIUM SERPL-MCNC: 9.7 MG/DL (ref 8.5–10.5)
CHLORIDE BLD-SCNC: 101 MMOL/L (ref 98–107)
CHOLEST SERPL-MCNC: 175 MG/DL
CO2 SERPL-SCNC: 32 MMOL/L (ref 22–31)
CREAT SERPL-MCNC: 1.18 MG/DL (ref 0.6–1.1)
EOSINOPHIL # BLD AUTO: 0.2 THOU/UL (ref 0–0.4)
EOSINOPHIL NFR BLD AUTO: 3 % (ref 0–6)
ERYTHROCYTE [DISTWIDTH] IN BLOOD BY AUTOMATED COUNT: 12.6 % (ref 11–14.5)
FASTING STATUS PATIENT QL REPORTED: YES
GFR SERPL CREATININE-BSD FRML MDRD: 45 ML/MIN/1.73M2
GLUCOSE BLD-MCNC: 95 MG/DL (ref 70–125)
HCT VFR BLD AUTO: 38 % (ref 35–47)
HDLC SERPL-MCNC: 62 MG/DL
HGB BLD-MCNC: 12.5 G/DL (ref 12–16)
LDLC SERPL CALC-MCNC: 88 MG/DL
LYMPHOCYTES # BLD AUTO: 1.3 THOU/UL (ref 0.8–4.4)
LYMPHOCYTES NFR BLD AUTO: 22 % (ref 20–40)
MCH RBC QN AUTO: 29.8 PG (ref 27–34)
MCHC RBC AUTO-ENTMCNC: 32.9 G/DL (ref 32–36)
MCV RBC AUTO: 90 FL (ref 80–100)
MONOCYTES # BLD AUTO: 0.4 THOU/UL (ref 0–0.9)
MONOCYTES NFR BLD AUTO: 6 % (ref 2–10)
NEUTROPHILS # BLD AUTO: 4.1 THOU/UL (ref 2–7.7)
NEUTROPHILS NFR BLD AUTO: 69 % (ref 50–70)
PLATELET # BLD AUTO: 370 THOU/UL (ref 140–440)
PMV BLD AUTO: 6.9 FL (ref 7–10)
POTASSIUM BLD-SCNC: 3.6 MMOL/L (ref 3.5–5)
PROT SERPL-MCNC: 6.9 G/DL (ref 6–8)
RBC # BLD AUTO: 4.2 MILL/UL (ref 3.8–5.4)
SODIUM SERPL-SCNC: 141 MMOL/L (ref 136–145)
TRIGL SERPL-MCNC: 124 MG/DL
WBC: 6 THOU/UL (ref 4–11)

## 2020-01-27 ASSESSMENT — MIFFLIN-ST. JEOR: SCORE: 1391.77

## 2020-05-29 ENCOUNTER — COMMUNICATION - HEALTHEAST (OUTPATIENT)
Dept: INTERNAL MEDICINE | Facility: CLINIC | Age: 70
End: 2020-05-29

## 2020-05-29 DIAGNOSIS — E78.5 HLD (HYPERLIPIDEMIA): ICD-10-CM

## 2020-05-29 DIAGNOSIS — I10 ESSENTIAL HYPERTENSION: ICD-10-CM

## 2020-06-22 ENCOUNTER — COMMUNICATION - HEALTHEAST (OUTPATIENT)
Dept: ADMINISTRATIVE | Facility: HOSPITAL | Age: 70
End: 2020-06-22

## 2020-07-10 ENCOUNTER — COMMUNICATION - HEALTHEAST (OUTPATIENT)
Dept: ADMINISTRATIVE | Facility: HOSPITAL | Age: 70
End: 2020-07-10

## 2020-07-20 ENCOUNTER — RECORDS - HEALTHEAST (OUTPATIENT)
Dept: ADMINISTRATIVE | Facility: OTHER | Age: 70
End: 2020-07-20

## 2020-08-20 ENCOUNTER — HOSPITAL ENCOUNTER (OUTPATIENT)
Dept: MRI IMAGING | Facility: CLINIC | Age: 70
Setting detail: RADIATION/ONCOLOGY SERIES
Discharge: STILL ADMITTED OR ADMINISTRATIVE | End: 2020-08-20
Attending: INTERNAL MEDICINE

## 2020-08-20 DIAGNOSIS — C83.390 PRIMARY CNS LYMPHOMA: ICD-10-CM

## 2020-08-20 LAB
CREAT BLD-MCNC: 1.3 MG/DL (ref 0.6–1.1)
GFR SERPL CREATININE-BSD FRML MDRD: 40 ML/MIN/1.73M2

## 2020-08-25 ENCOUNTER — AMBULATORY - HEALTHEAST (OUTPATIENT)
Dept: INFUSION THERAPY | Facility: CLINIC | Age: 70
End: 2020-08-25

## 2020-08-25 ENCOUNTER — OFFICE VISIT - HEALTHEAST (OUTPATIENT)
Dept: ONCOLOGY | Facility: CLINIC | Age: 70
End: 2020-08-25

## 2020-08-25 DIAGNOSIS — D50.0 IRON DEFICIENCY ANEMIA DUE TO CHRONIC BLOOD LOSS: ICD-10-CM

## 2020-08-25 DIAGNOSIS — C83.390 PRIMARY CNS LYMPHOMA: ICD-10-CM

## 2020-08-25 DIAGNOSIS — E61.1 IRON DEFICIENCY: ICD-10-CM

## 2020-08-25 LAB
ALBUMIN SERPL-MCNC: 3.6 G/DL (ref 3.5–5)
ALP SERPL-CCNC: 111 U/L (ref 45–120)
ALT SERPL W P-5'-P-CCNC: 31 U/L (ref 0–45)
ANION GAP SERPL CALCULATED.3IONS-SCNC: 12 MMOL/L (ref 5–18)
AST SERPL W P-5'-P-CCNC: 31 U/L (ref 0–40)
BASOPHILS # BLD AUTO: 0 THOU/UL (ref 0–0.2)
BASOPHILS NFR BLD AUTO: 0 % (ref 0–2)
BILIRUB SERPL-MCNC: 0.3 MG/DL (ref 0–1)
BUN SERPL-MCNC: 18 MG/DL (ref 8–28)
CALCIUM SERPL-MCNC: 9.4 MG/DL (ref 8.5–10.5)
CHLORIDE BLD-SCNC: 100 MMOL/L (ref 98–107)
CO2 SERPL-SCNC: 27 MMOL/L (ref 22–31)
CREAT SERPL-MCNC: 1.25 MG/DL (ref 0.6–1.1)
EOSINOPHIL # BLD AUTO: 0.2 THOU/UL (ref 0–0.4)
EOSINOPHIL NFR BLD AUTO: 3 % (ref 0–6)
ERYTHROCYTE [DISTWIDTH] IN BLOOD BY AUTOMATED COUNT: 15.1 % (ref 11–14.5)
GFR SERPL CREATININE-BSD FRML MDRD: 42 ML/MIN/1.73M2
GLUCOSE BLD-MCNC: 98 MG/DL (ref 70–125)
HCT VFR BLD AUTO: 30.6 % (ref 35–47)
HGB BLD-MCNC: 9.2 G/DL (ref 12–16)
IMM GRANULOCYTES # BLD: 0 THOU/UL
IMM GRANULOCYTES NFR BLD: 0 %
LDH SERPL L TO P-CCNC: 162 U/L (ref 125–220)
LYMPHOCYTES # BLD AUTO: 1.3 THOU/UL (ref 0.8–4.4)
LYMPHOCYTES NFR BLD AUTO: 24 % (ref 20–40)
MCH RBC QN AUTO: 25.8 PG (ref 27–34)
MCHC RBC AUTO-ENTMCNC: 30.1 G/DL (ref 32–36)
MCV RBC AUTO: 86 FL (ref 80–100)
MONOCYTES # BLD AUTO: 0.4 THOU/UL (ref 0–0.9)
MONOCYTES NFR BLD AUTO: 6 % (ref 2–10)
NEUTROPHILS # BLD AUTO: 3.7 THOU/UL (ref 2–7.7)
NEUTROPHILS NFR BLD AUTO: 66 % (ref 50–70)
PLATELET # BLD AUTO: 365 THOU/UL (ref 140–440)
PMV BLD AUTO: 9 FL (ref 8.5–12.5)
POTASSIUM BLD-SCNC: 3.3 MMOL/L (ref 3.5–5)
PROT SERPL-MCNC: 7.2 G/DL (ref 6–8)
RBC # BLD AUTO: 3.57 MILL/UL (ref 3.8–5.4)
SODIUM SERPL-SCNC: 139 MMOL/L (ref 136–145)
WBC: 5.6 THOU/UL (ref 4–11)

## 2020-08-28 ENCOUNTER — INFUSION - HEALTHEAST (OUTPATIENT)
Dept: INFUSION THERAPY | Facility: CLINIC | Age: 70
End: 2020-08-28

## 2020-08-28 DIAGNOSIS — E61.1 IRON DEFICIENCY: ICD-10-CM

## 2020-08-28 DIAGNOSIS — T50.905S ADVERSE EFFECT OF DRUG OR MEDICAMENT, SEQUELA: ICD-10-CM

## 2020-08-28 DIAGNOSIS — D50.0 BLOOD LOSS ANEMIA: ICD-10-CM

## 2020-08-28 DIAGNOSIS — N18.30 CKD (CHRONIC KIDNEY DISEASE) STAGE 3, GFR 30-59 ML/MIN (H): ICD-10-CM

## 2020-09-04 ENCOUNTER — INFUSION - HEALTHEAST (OUTPATIENT)
Dept: INFUSION THERAPY | Facility: CLINIC | Age: 70
End: 2020-09-04

## 2020-09-04 DIAGNOSIS — D50.0 BLOOD LOSS ANEMIA: ICD-10-CM

## 2020-09-04 DIAGNOSIS — E61.1 IRON DEFICIENCY: ICD-10-CM

## 2020-09-04 DIAGNOSIS — T50.905S ADVERSE EFFECT OF DRUG OR MEDICAMENT, SEQUELA: ICD-10-CM

## 2020-09-04 DIAGNOSIS — N18.30 CKD (CHRONIC KIDNEY DISEASE) STAGE 3, GFR 30-59 ML/MIN (H): ICD-10-CM

## 2020-10-26 ENCOUNTER — AMBULATORY - HEALTHEAST (OUTPATIENT)
Dept: INFUSION THERAPY | Facility: CLINIC | Age: 70
End: 2020-10-26

## 2020-10-26 DIAGNOSIS — D50.0 IRON DEFICIENCY ANEMIA DUE TO CHRONIC BLOOD LOSS: ICD-10-CM

## 2020-10-26 DIAGNOSIS — E61.1 IRON DEFICIENCY: ICD-10-CM

## 2020-10-26 LAB
ERYTHROCYTE [DISTWIDTH] IN BLOOD BY AUTOMATED COUNT: 18 % (ref 11–14.5)
FERRITIN SERPL-MCNC: 371 NG/ML (ref 10–130)
HCT VFR BLD AUTO: 42.6 % (ref 35–47)
HGB BLD-MCNC: 13.6 G/DL (ref 12–16)
IRON SATN MFR SERPL: 37 % (ref 20–50)
IRON SERPL-MCNC: 109 UG/DL (ref 42–175)
MCH RBC QN AUTO: 29.7 PG (ref 27–34)
MCHC RBC AUTO-ENTMCNC: 31.9 G/DL (ref 32–36)
MCV RBC AUTO: 93 FL (ref 80–100)
PLATELET # BLD AUTO: 332 THOU/UL (ref 140–440)
PMV BLD AUTO: 9.1 FL (ref 8.5–12.5)
RBC # BLD AUTO: 4.58 MILL/UL (ref 3.8–5.4)
TIBC SERPL-MCNC: 291 UG/DL (ref 313–563)
TRANSFERRIN SERPL-MCNC: 233 MG/DL (ref 212–360)
WBC: 7.3 THOU/UL (ref 4–11)

## 2020-10-27 ENCOUNTER — COMMUNICATION - HEALTHEAST (OUTPATIENT)
Dept: ONCOLOGY | Facility: CLINIC | Age: 70
End: 2020-10-27

## 2021-01-19 ENCOUNTER — OFFICE VISIT - HEALTHEAST (OUTPATIENT)
Dept: FAMILY MEDICINE | Facility: CLINIC | Age: 71
End: 2021-01-19

## 2021-01-19 DIAGNOSIS — M85.89 OSTEOPENIA OF MULTIPLE SITES: ICD-10-CM

## 2021-01-19 DIAGNOSIS — L72.3 SEBACEOUS CYST: ICD-10-CM

## 2021-01-19 DIAGNOSIS — Z12.31 VISIT FOR SCREENING MAMMOGRAM: ICD-10-CM

## 2021-01-29 ENCOUNTER — COMMUNICATION - HEALTHEAST (OUTPATIENT)
Dept: SCHEDULING | Facility: CLINIC | Age: 71
End: 2021-01-29

## 2021-02-17 ENCOUNTER — AMBULATORY - HEALTHEAST (OUTPATIENT)
Dept: PHARMACY | Facility: HOSPITAL | Age: 71
End: 2021-02-17

## 2021-03-15 ENCOUNTER — HOSPITAL ENCOUNTER (OUTPATIENT)
Dept: MAMMOGRAPHY | Facility: CLINIC | Age: 71
Discharge: HOME | End: 2021-03-15
Attending: NURSE PRACTITIONER

## 2021-03-15 DIAGNOSIS — Z12.31 VISIT FOR SCREENING MAMMOGRAM: ICD-10-CM

## 2021-03-17 ENCOUNTER — RECORDS - HEALTHEAST (OUTPATIENT)
Dept: ADMINISTRATIVE | Facility: OTHER | Age: 71
End: 2021-03-17

## 2021-03-17 ENCOUNTER — RECORDS - HEALTHEAST (OUTPATIENT)
Dept: BONE DENSITY | Facility: CLINIC | Age: 71
End: 2021-03-17

## 2021-03-17 DIAGNOSIS — M85.89 OTHER SPECIFIED DISORDERS OF BONE DENSITY AND STRUCTURE, MULTIPLE SITES: ICD-10-CM

## 2021-03-21 ENCOUNTER — COMMUNICATION - HEALTHEAST (OUTPATIENT)
Dept: INTERNAL MEDICINE | Facility: CLINIC | Age: 71
End: 2021-03-21

## 2021-03-21 DIAGNOSIS — E78.5 HLD (HYPERLIPIDEMIA): ICD-10-CM

## 2021-03-22 RX ORDER — ATORVASTATIN CALCIUM 40 MG/1
TABLET, FILM COATED ORAL
Qty: 45 TABLET | Refills: 3 | Status: SHIPPED | OUTPATIENT
Start: 2021-03-22 | End: 2022-04-15

## 2021-03-23 ENCOUNTER — AMBULATORY - HEALTHEAST (OUTPATIENT)
Dept: INFUSION THERAPY | Facility: CLINIC | Age: 71
End: 2021-03-23

## 2021-03-23 ENCOUNTER — COMMUNICATION - HEALTHEAST (OUTPATIENT)
Dept: INTERNAL MEDICINE | Facility: CLINIC | Age: 71
End: 2021-03-23

## 2021-03-23 DIAGNOSIS — E61.1 IRON DEFICIENCY: ICD-10-CM

## 2021-03-23 DIAGNOSIS — D50.0 IRON DEFICIENCY ANEMIA DUE TO CHRONIC BLOOD LOSS: ICD-10-CM

## 2021-03-23 DIAGNOSIS — I10 ESSENTIAL HYPERTENSION: ICD-10-CM

## 2021-03-23 LAB
ERYTHROCYTE [DISTWIDTH] IN BLOOD BY AUTOMATED COUNT: 12.9 % (ref 11–14.5)
FERRITIN SERPL-MCNC: 42 NG/ML (ref 10–130)
HCT VFR BLD AUTO: 41.2 % (ref 35–47)
HGB BLD-MCNC: 13.3 G/DL (ref 12–16)
MCH RBC QN AUTO: 30.4 PG (ref 27–34)
MCHC RBC AUTO-ENTMCNC: 32.3 G/DL (ref 32–36)
MCV RBC AUTO: 94 FL (ref 80–100)
PLATELET # BLD AUTO: 315 THOU/UL (ref 140–440)
PMV BLD AUTO: 9.1 FL (ref 8.5–12.5)
RBC # BLD AUTO: 4.37 MILL/UL (ref 3.8–5.4)
WBC: 7.2 THOU/UL (ref 4–11)

## 2021-03-24 RX ORDER — HYDROCHLOROTHIAZIDE 25 MG/1
25 TABLET ORAL DAILY
Qty: 90 TABLET | Refills: 1 | Status: SHIPPED | OUTPATIENT
Start: 2021-03-24 | End: 2021-10-11

## 2021-03-25 ENCOUNTER — COMMUNICATION - HEALTHEAST (OUTPATIENT)
Dept: ONCOLOGY | Facility: CLINIC | Age: 71
End: 2021-03-25

## 2021-05-26 VITALS
HEART RATE: 59 BPM | TEMPERATURE: 97.8 F | OXYGEN SATURATION: 95 % | DIASTOLIC BLOOD PRESSURE: 63 MMHG | SYSTOLIC BLOOD PRESSURE: 130 MMHG

## 2021-05-26 NOTE — TELEPHONE ENCOUNTER
Refill Approved    Rx renewed per Medication Renewal Policy. Medication was last renewed on 2/15/19. 2/14/18    Jacquelyn Norman, [facility] Triage/Med Refill 3/23/2019     Requested Prescriptions   Pending Prescriptions Disp Refills     omeprazole (PRILOSEC) 20 MG capsule [Pharmacy Med Name: OMEPRAZOLE 20MG CAPSULES] 60 capsule 0     Sig: TAKE 1 CAPSULE(20 MG) BY MOUTH TWICE DAILY BEFORE MEALS    GI Medications Refill Protocol Passed - 3/22/2019 10:42 AM       Passed - PCP or prescribing provider visit in last 12 or next 3 months.    Last office visit with prescriber/PCP: 2/14/2019 Tamia Sher FNP OR same dept: 2/14/2019 Tamia Sher FNP OR same specialty: 2/14/2019 Tamia Sher FNP  Last physical: 9/18/2018 Last MTM visit: Visit date not found   Next visit within 3 mo: Visit date not found  Next physical within 3 mo: Visit date not found  Prescriber OR PCP: MAYI Portillo  Last diagnosis associated with med order: 1. HLD (hyperlipidemia)  - atorvastatin (LIPITOR) 40 MG tablet [Pharmacy Med Name: ATORVASTATIN 40MG TABLETS]; TAKE 1/2 TABLET BY MOUTH ONCE DAILY.  Dispense: 90 tablet; Refill: 0    If protocol passes may refill for 12 months if within 3 months of last provider visit (or a total of 15 months).             atorvastatin (LIPITOR) 40 MG tablet [Pharmacy Med Name: ATORVASTATIN 40MG TABLETS] 90 tablet 0     Sig: TAKE 1/2 TABLET BY MOUTH ONCE DAILY.    Statins Refill Protocol (Hmg CoA Reductase Inhibitors) Passed - 3/22/2019 10:42 AM       Passed - PCP or prescribing provider visit in past 12 months     Last office visit with prescriber/PCP: 2/14/2019 Tamia Sher FNP OR pavel dept: 2/14/2019 Tamia Sher FNP OR same specialty: 2/14/2019 Tamia Sher FNP  Last physical: 9/18/2018 Last MTM visit: Visit date not found   Next visit within 3 mo: Visit date not found  Next physical within 3 mo: Visit date not found  Prescriber OR PCP: MAYI Portillo  Last  diagnosis associated with med order: 1. HLD (hyperlipidemia)  - atorvastatin (LIPITOR) 40 MG tablet [Pharmacy Med Name: ATORVASTATIN 40MG TABLETS]; TAKE 1/2 TABLET BY MOUTH ONCE DAILY.  Dispense: 90 tablet; Refill: 0    If protocol passes may refill for 12 months if within 3 months of last provider visit (or a total of 15 months).

## 2021-05-27 VITALS
OXYGEN SATURATION: 100 % | DIASTOLIC BLOOD PRESSURE: 60 MMHG | TEMPERATURE: 98 F | HEART RATE: 69 BPM | SYSTOLIC BLOOD PRESSURE: 119 MMHG

## 2021-05-29 NOTE — PROGRESS NOTES
Assessment:     1. Left maxillary sinusitis  amoxicillin-clavulanate (AUGMENTIN) 875-125 mg per tablet          Plan:     Differential diagnosis include but not limited to upper respiratory infection, sinus congestion, oral ulcers, or sinusitis.  On exam patient with left-sided maxillary sinus tenderness which is consistent with sinusitis.  Patient oral cavity normal, a small raised area noted on the roof of the left side of the mouth.  No signs and symptoms of infection noted.  No redness noted.  No purulent drainage noted.  Discussed with the patient in regard to this findings.  We will treat patient for sinusitis with Augmentin twice daily x10 days.  Advised patient to eat food prior to taking her medication since she might experience GI symptoms.  May continue taking Aleve for the pain.  Increase fluid intake.  Monitor for worsening symptoms.  May follow-up with PCP if her symptoms does not resolve after treatment.  Patient verbalized understanding the plan of care.    Subjective:       69 y.o. female presents for evaluation of possible sinus infection.  Patient reports that she has been having symptoms for the last 10 days.  She has pain to the left side of her maxillary area and also to her ear.  She has not had any nasal drainage, no fever, she admits to some cough.  No nausea, vomiting, or diarrhea.  She also reports that she has a big lump on top of the roof of her mouth.  She is suspecting it could be an infection secondary to her sinus infection.  She has been taking Aleve 2 times a day it helps with the pain at the symptoms are still there.  For her mouth she has been using Sensodyne toothpaste to help decrease the sensitivity.  She has had postnasal drainage with very little cough.  The left side of her face was swollen for 5 days ago but now has decreased but she feels like she has tenderness inside especially close to the bridge of her nose.  She apparently has sinus pain and headache about 4 or 5 on  a scale of 0-10.    The following portions of the patient's history were reviewed and updated as appropriate: allergies, current medications, past family history, past medical history, past social history, past surgical history and problem list.    Review of Systems  A 12 point comprehensive review of systems was negative except as noted.      Objective:      /80 (Patient Site: Right Arm, Patient Position: Sitting, Cuff Size: Adult Regular)   Pulse 73   Temp 97.3  F (36.3  C) (Oral)   Resp 16   Wt 196 lb (88.9 kg)   LMP  (LMP Unknown)   SpO2 97%   BMI 31.16 kg/m    General appearance: alert, appears stated age, cooperative and moderate distress  Head: Normocephalic, without obvious abnormality, atraumatic, sinuses tender to percussion  Eyes: conjunctivae/corneas clear. PERRL, EOM's intact. Fundi benign.  Ears: normal TM's and external ear canals both ears  Nose: purulent and scant discharge, sinus tenderness left, severe maxillary sinus tenderness left  Throat: lips, mucosa, and tongue normal; teeth and gums normal and small lump on the roof of the mouth to the left side  Lungs: clear to auscultation bilaterally  Heart: regular rate and rhythm, S1, S2 normal, no murmur, click, rub or gallop  Extremities: extremities normal, atraumatic, no cyanosis or edema  Pulses: 2+ and symmetric  Skin: Skin color, texture, turgor normal. No rashes or lesions  Lymph nodes: Cervical, supraclavicular, and axillary nodes normal.  Neurologic: Grossly normal     This note has been dictated using voice recognition software. Any grammatical or context distortions are unintentional and inherent to the software

## 2021-05-29 NOTE — TELEPHONE ENCOUNTER
"\"I think I have a sinus infection and need to be seen soon. 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 Sx are not going away.  Pain from just below her left eye down into her teeth and into her ear. She states she also has a big swollen lump on the inside of her mouth. \"I have had this before, but I didn't have the big lump inside:the size of a grape.\" SX began 10 days ago. She has been taking Aleve two times a day--it helps. She has used Sensodyne toothpaste to help decrease the sensitivity. She is sure it is not her teeth causing the pain, she has a bridge there. She took Sudafed a couple of days ago. No nasal congestion or drainage. She states she has drainage  in the back of her throat, a little cough. The left side of her face was swollen 4-5 days ago, and has decreased, still a little swollen. Currently sinus pain, headache=\"4-5\".  Lives in [place].    Reason for Disposition    [1] Redness or swelling on the cheek, forehead or around the eye AND [2] no fever    Protocols used: SINUS PAIN OR CONGESTION-A-    Triaged to a disposition of See physician within 4 hrs. Discussed options: [facility], or [facility]. She states she will go to the [facility] now.    Radha Maldonado RN [facility] Triage Nurse  "

## 2021-05-30 VITALS — WEIGHT: 196.8 LBS | BODY MASS INDEX: 30.82 KG/M2

## 2021-05-30 VITALS — WEIGHT: 198 LBS | HEIGHT: 67 IN | BODY MASS INDEX: 31.08 KG/M2

## 2021-05-30 VITALS — WEIGHT: 198.5 LBS | BODY MASS INDEX: 31.16 KG/M2 | HEIGHT: 67 IN

## 2021-05-30 VITALS — WEIGHT: 204.6 LBS | BODY MASS INDEX: 32.53 KG/M2

## 2021-05-30 NOTE — PROGRESS NOTES
Assessment:   1. 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  If symptoms persist patient to contact her urologist.  Patient verbalized understanding.    2. Visit for screening mammogram  Order placed.  - Mammo Screening Bilateral; Future    Plan:   1. Watch and wait  2. Return for follow-up as needed.     Subjective:   Mariela Jane is a 69 y.o. female who presents for evaluation of a lump on the roof of her mouth. The patient reports that she was recent diagnosed with sinus infection and was started on antibiotic. She reports that the lump has been present since she started having sinus pain tooth ache from the sinus infection. She reports that she is almost done with the antibiotic and the lump has not changed.  Associated symptoms were positive for none of significance. There has not been a history of primary CNS lymphoma. She has an up coming MRI of her brain.  She denied pain or any other discomfort in her mouth.    The following portions of the patient's history were reviewed and updated as appropriate: allergies, current medications, past family history, past medical history, past social history, past surgical history and problem list.    Review of Systems  Pertinent items are noted in HPI.       Objective:      /65   Pulse 86   Temp 98.6  F (37  C) (Oral)   Wt 196 lb 1 oz (88.9 kg)   LMP  (LMP Unknown)   SpO2 99%   BMI 31.17 kg/m      General:   healthy, alert, appears stated age, not in distress   Head and Face:   facial movement was normal and symmetrical, nontender   External Ears:   normal pinnae shape and position   Oropharynx:   1 cm lump on the upper palate    Tonsils:   normal size, normal appearance   Post. Pharynx:   normal mucosa   Neck:   no asymmetry, masses, or scars   Thyroid:   Normal

## 2021-05-30 NOTE — PROGRESS NOTES
"  Assessment:       1. Dental abscess  amoxicillin-clavulanate (AUGMENTIN) 875-125 mg per tablet    Ambulatory referral to Dentistry   2. Sinus pain  CT Sinuses Without Contrast         Plan:       Antibiotics per orders.  Acetaminophen use discussed.  Referral to dentistry for follow-up in 24 hours.  Discussed signs of worsening symptoms and when to follow-up with PCP if no symptom improvement.      Subjective:       Mariela Jane is a 69 y.o. female here for evaluation of ongoing sinus pain and a oral lump. Patient was seen on 6/18 at the [facility] when she was diagnosed with sinusitis. She then took a 10-day course of Augmentin, which helped her symptoms but never completely resolved. Patient's symptoms have now been worsening again over the last 1-2 days. Patient is experiencing pressure and pain over the left maxillary sinus, tooth pain, and a lump in her mouth described as a \"pimple\". The lump was draining pus-like discharge yesterday. Associated symptoms include a \"yucky\" stomach. Patient denies fevers, emesis, and ear pains.     The following portions of the patient's history were reviewed and updated as appropriate: allergies, current medications and problem list.    Review of Systems  Pertinent items are noted in HPI.     Allergies  Allergies   Allergen Reactions     Omeprazole Rash     Losartan Cough     intolerance     Hydralazine Rash     Pentamidine Isethionate Rash         Objective:       /81 (Patient Site: Right Arm, Patient Position: Sitting, Cuff Size: Adult Regular)   Pulse 79   Temp 97.6  F (36.4  C) (Oral)   Resp 15   Wt 196 lb (88.9 kg)   LMP  (LMP Unknown)   SpO2 98%   Breastfeeding? No   BMI 31.16 kg/m    General appearance: alert, appears stated age, cooperative, no distress and non-toxic  Head: Normocephalic, without obvious abnormality, atraumatic, left maxillary sinus tender to percussion  Ears: normal TM's and external ear canals both ears  Nose: no discharge  Throat: no " tonsil swelling, erythema or exudate; 7 mm raised pustule at the hard pallet, no surrounding erythema; MMM, lips and tongue normal  Neck: no adenopathy and supple, symmetrical, trachea midline    Imaging    Ct Sinuses Without Contrast    Result Date: 7/4/2019  EXAM: CT SINUS WO CONTRAST LOCATION: [facility] DATE/TIME: 7/4/2019 1:56 PM INDICATION: Patient has ongoing left maxillary sinusitis symptoms with new pustules forming on the soft palate; rule out abscess and sinusitis COMPARISON: 3/4/2014 facial bone CT. TECHNIQUE: Routine without contrast. Multiplanar reformats. Dose reduction techniques were used. FINDINGS:  Tiny polyp versus mucous retention cyst along the anterior inferior right maxillary sinus. Bilateral maxillary sinuses are otherwise clear. Ostiomeatal units are clear. Sphenoid sinus, sphenoid ostia, ethmoid air cells, frontal sinuses, and frontal recesses are clear. Mastoid air cells are clear. Periapical lucency concerning for a periapical abscess is seen adjacent to the root of tooth #11. This demonstrates cortical breakthrough posteriorly, with extension of inflammation into the adjacent soft tissues of the anterior soft palate. Contrast-enhanced CT could be considered to evaluate for an associated soft tissue abscess if clinically warranted. No acute intracranial abnormality.     CONCLUSION: 1.  Periapical lucency concerning for periapical abscesses adjacent to the root of tooth #11. This demonstrates cortical breakthrough posteriorly with extension of inflammation into the adjacent soft tissues of the anterior soft palate. If clinical concern warrants further evaluation for an associated small abscess, contrast-enhanced CT could be considered. Of note, there is no large fluid collection in this area. 2.  Relatively clear paranasal sinuses with only a tiny polyp versus mucous retention cyst in the anterior inferior right maxillary sinus.    I personally reviewed the results, which showed  a dental abscess. Discussed findings with the patient.

## 2021-05-31 ENCOUNTER — RECORDS - HEALTHEAST (OUTPATIENT)
Dept: ADMINISTRATIVE | Facility: CLINIC | Age: 71
End: 2021-05-31

## 2021-05-31 VITALS — WEIGHT: 194 LBS | BODY MASS INDEX: 30.84 KG/M2

## 2021-05-31 VITALS — BODY MASS INDEX: 31.48 KG/M2 | WEIGHT: 198 LBS

## 2021-05-31 VITALS — WEIGHT: 193 LBS | BODY MASS INDEX: 30.68 KG/M2

## 2021-05-31 NOTE — TELEPHONE ENCOUNTER
----- Message from Radha Corbin MD sent at 8/16/2019  8:13 AM CDT -----  Please inform that her iron is low. Recommend IV iron 2 does x1 week apart as I discussed in clinic. Please transfer her to scheduling after you tell her.   thanks

## 2021-05-31 NOTE — PROGRESS NOTES
Please inform that her iron is low. Recommend IV iron 2 does x1 week apart as I discussed in clinic. Please transfer her to scheduling after you tell her.   thanks

## 2021-05-31 NOTE — PROGRESS NOTES
Pt here today for feraheme infusion. Medication and side effects reviewed. She tolerated infusion well, VSS. She left clinic ambulatory and independent.

## 2021-05-31 NOTE — PROGRESS NOTES
Pt came into infusion clinic for her Feraheme as ordered. Medications explained to pt who verbalized understanding. IV patent throughout infusion. Pt tolerated infusion with no complications. Pt monitored post infusion with no complications. Pt left infusion clinic via ambulatory and will RTC as ordered.

## 2021-05-31 NOTE — PROGRESS NOTES
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    ECOG Performance   ECOG Performance Status: 1     Distress Assessment  Distress Assessment Score: Unable to rate(daughter's living situation/relationship)    Pain  Currently in Pain: No/denies      #.  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  Reviewed the MRI brain results and it did not show any evidence of recurrent lymphoma.  She was very glad to hear that.  She is now 5-year out from completion of primary CNS lymphoma.   Discussed about follow-up exam and MRI brain in 1 year.  She is advised to call me sooner with any concerns.     #. H/o Iron deficiency anemia   She had an EGD, colonoscopy and capsule endoscopy in March 2016 at [facility] and all were unremarkable.  She also had a PET scan on the same day and it did not identify any active lymphoma.  She has a mild asymmetric fullness in the right base with SUV 5.6 likely to be inflammatory and low FDG activity right level II cervical lymph nodes with SUV 3.5 likely to be reactive.She was advised to continue on iron supplementation lifelong per her hematologist at [facility].     Reviewed hemogram today and hemoglobin was 8.8 g/dL about 2 g drop from 6 months ago.  No obvious blood loss.  She has not been taking oral iron treatment due to GI side effects.    She restarted oral iron tablet 1 tablet a day today.  I advised her to take with citrus juice.     I will add iron studies, vitamin B12.  If the study consistent with iron deficiency, I advised her to come back in for IV iron treatment.  She voiced understanding.       #.   History of pulmonary embolism, diagnosed in June 2014.     - treated with anticoagulation.   - She is fully aware of signs and symptoms of venous thromboembolism and time to seek medical attention.     Problem List    1. Primary CNS lymphoma (H)  Comprehensive Metabolic Panel    HM1(CBC and Differential)    Lactate Dehydrogenase (LDH)    HM1 (CBC with Diff)    Comprehensive Metabolic Panel    HM1(CBC and Differential)    Lactate Dehydrogenase (LDH)    MR Brain With Without Contrast   2. Iron deficiency anemia due to chronic blood loss  Ferritin    Iron and Transferrin Iron Binding Capacity    Vitamin B12      ______________________________________________________________________________  Cancer history  March 2014: 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  Presented with 2 months history of headache and blurred and double vision.  April 2014 to June 2014-completed 3 cycles of induction MRT (methotrexate was discontinued after 3 cycles due to prolonged profound cytopenia)  9/3/2014- underwent autologous stem cell transplantation with conditioning chemotherapy with BCNU and thiotepa at [facility].    History of Present Illness    Mariela Gardner today as self.  She is overall doing well.  She recently had eye treatment for macular degeneration and after treatment she is seeing little bit better.  She was very happy with the result.  She admitted that she has not been taking iron supplements for a while and now she has restarted iron tablets today.  She reported that it upset her stomach a lot and she was taking Zantac and Prilosec.  She denies bleeding.      Pain Status  Currently in Pain: No/denies    Review of Systems    Constitutional  Constitutional (WDL): Exceptions to WDL  Fatigue: Concerns(some relief with rest)  Fever: No Concerns  Chills: No Concerns  Weight Gain: No Concerns  Weight Loss: No Concerns  Hot flashes/Night Sweats: No Concerns  Neurosensory  Neurosensory  (WDL): Exceptions to WDL  Peripheral Motor Neuropathy: Concerns(feet)  Ataxia: Concerns(pt reports OK)  Peripheral Sensory Neuropathy: Concerns  Confusion: No Concerns  Dizziness: No Concerns  Syncope: No Concerns  Eye   Eye Disorder (WDL): All eye disorder elements are within defined limits(had surgery for macula )  Blurred Vision: No Concerns  Dry Eye: No Concerns  Eye Pain: No Concerns  Watering Eyes: No Concerns  Ear  Ear Disorder (WDL): Exceptions to WDL  Ear Pain: No Concerns  Tinnitus: Concerns(bilateral )  Cardiovascular  Cardiovascular (WDL): All cardiovascular elements are within defined limits  Palpitations: No Concerns  Edema: No Concerns  SVT, DVT/PE: No Concerns  Chest Pain - Cardiac: No Concerns  Pulmonary  Respiratory (WDL): Exceptions to WDL  Cough: No Concerns  Dyspnea: Concerns(with activity)  Hypoxia: No Concerns  Gastrointestinal  Gastrointestinal (WDL): All gastrointestinal elements are within defined limits  Anorexia: No Concerns  Nausea: No Concerns  Vomiting: No Concerns  Dehydration: No Concerns  Dysgeusia: No Concerns  Dysphagia: No Concerns  Mucositis Oral: No Concerns  Esophagitis: No Concerns  Constipation: No Concerns  Diarrhea: No Concerns  Pharyngitis: No Concerns  Dry Mouth: No Concerns  Genitourinary  Genitourinary (WDL): Exceptions to WDL(incontinence )  Urinary Frequency: No Concerns  Urinary Retention: No Concerns  Urinary Tract Pain: No Concerns  Lymphatic  Lymph (WDL): All lymph disorder elements are within defined limits  Lymphedema: No Concerns  Lymph Node Discomfort: No Concerns  Musculoskeletal and Connective Tissue  Musculoskeletal and Connetive Tissue Disorders (WDL): Exceptions to WDL  Arthralgia: Concerns  Bone Pain: No Concerns  Muscle Weakness : No Concerns  Myalgia: Concerns(mayelin horses in legs occ)  Integumentary  Integumentary (WDL): All integumentary elements are within defined limits  Alopecia: No Concerns  Rash Maculo-Papular: No Concerns  Pruritus: No  Concerns  Urticaria: No Concerns  Palmar-Plantar Erythrodysesthesia Syndrome: No Concerns  Flushing: No Concerns  Patient Coping  Patient Coping: Accepting;Open/discussion  Accompanied by  Accompanied by: Alone  Oral Chemo Adherence       Past History  Past Medical History:   Diagnosis Date     Anemia     While at [place]     CNS lymphoma (H) 2014     GERD (gastroesophageal reflux disease)      Multiple rib fractures March 2014     Pelvic fracture (H) march 2014     Primary CNS lymphoma (H)      Sinusitis        Physical Exam    Recent Vitals 8/15/2019   Weight 196 lbs 10 oz   BSA (m2) 2.05 m2   /62   Pulse 81   Temp 97.8   Temp src 1   SpO2 98   Some recent data might be hidden     General: alert, awake, not in acute distress  HEENT: Head: Normal, normocephalic, atraumatic.  Eye: Normal external eye, conjunctiva, lids cornea, DHRUV.  Ears:  Non-tender.  Nose: Normal external nose, mucus membranes and septum.  Pharynx: Dental Hygiene adequate. Normal buccal mucosa. Normal pharynx.  Neck / Thyroid: Supple, no masses, nodes, nodules or enlargement.  Lymphatics: No abnormally enlarged lymph nodes.  Chest: Normal chest wall and respirations. Clear to auscultation.  Heart: S1 S2 RRR, no murmur.   Abdomen: abdomen is soft without significant tenderness, masses, organomegaly or guarding  Extremities: normal strength, tone, and muscle mass  Skin:  She is wearing compressive stockings.  CNS: non focal. Slow wide gait.      Lab Results    Recent Results (from the past 168 hour(s))   POCT CREATININE   Result Value Ref Range    iSTAT Creatinine 1.5 (H) 0.6 - 1.1 mg/dL    iSTAT GFR MDRD Af Amer 41 (L) >60 mL/min/1.73m2    iSTAT GFR MDRD Non Af Amer 34 (L) >60 mL/min/1.73m2   Comprehensive Metabolic Panel   Result Value Ref Range    Sodium 139 136 - 145 mmol/L    Potassium 3.0 (L) 3.5 - 5.0 mmol/L    Chloride 103 98 - 107 mmol/L    CO2 26 22 - 31 mmol/L    Anion Gap, Calculation 10 5 - 18 mmol/L    Glucose 147 (H) 70 - 125  mg/dL    BUN 23 8 - 28 mg/dL    Creatinine 1.63 (H) 0.60 - 1.10 mg/dL    GFR MDRD Af Amer 38 (L) >60 mL/min/1.73m2    GFR MDRD Non Af Amer 31 (L) >60 mL/min/1.73m2    Bilirubin, Total 0.3 0.0 - 1.0 mg/dL    Calcium 9.8 8.5 - 10.5 mg/dL    Protein, Total 7.3 6.0 - 8.0 g/dL    Albumin 3.6 3.5 - 5.0 g/dL    Alkaline Phosphatase 104 45 - 120 U/L    AST 16 0 - 40 U/L    ALT 10 0 - 45 U/L   Lactate Dehydrogenase (LDH)   Result Value Ref Range    LD (LDH) 162 125 - 220 U/L   HM1 (CBC with Diff)   Result Value Ref Range    WBC 6.6 4.0 - 11.0 thou/uL    RBC 3.30 (L) 3.80 - 5.40 mill/uL    Hemoglobin 8.8 (L) 12.0 - 16.0 g/dL    Hematocrit 28.8 (L) 35.0 - 47.0 %    MCV 87 80 - 100 fL    MCH 26.7 (L) 27.0 - 34.0 pg    MCHC 30.6 (L) 32.0 - 36.0 g/dL    RDW 14.1 11.0 - 14.5 %    Platelets 438 140 - 440 thou/uL    MPV 8.6 8.5 - 12.5 fL    Neutrophils % 74 (H) 50 - 70 %    Lymphocytes % 19 (L) 20 - 40 %    Monocytes % 5 2 - 10 %    Eosinophils % 2 0 - 6 %    Basophils % 1 0 - 2 %    Neutrophils Absolute 4.9 2.0 - 7.7 thou/uL    Lymphocytes Absolute 1.2 0.8 - 4.4 thou/uL    Monocytes Absolute 0.3 0.0 - 0.9 thou/uL    Eosinophils Absolute 0.2 0.0 - 0.4 thou/uL    Basophils Absolute 0.0 0.0 - 0.2 thou/uL       Imaging    Mr Brain With Without Contrast    Result Date: 8/13/2019  EXAM: MR BRAIN W WO CONTRAST LOCATION: [facility] DATE/TIME: 8/12/2019 1:29 PM INDICATION: Primary CNS lymphoma, surveillance COMPARISON: 02/07/2019 CONTRAST: Gadavist 9 ml TECHNIQUE: Multiplanar multisequence head MRI without and with intravenous contrast including dynamic susceptibility contrast perfusion imaging. FINDINGS: INTRACRANIAL CONTENTS: No acute or subacute infarct. No mass, acute hemorrhage, or extra-axial fluid collections. No focal elevation of relative cerebral blood volume on perfusion imaging. Patchy and confluent T2/FLAIR signal changes in the cerebral white matter and irina. There is some gliosis and volume loss in the right  frontal white matter. Ventriculomegaly disproportionate to the degree of cerebral volume loss. Correlate for normal pressure hydrocephalus. Normal position of the cerebellar tonsils. SELLA: No significant abnormality accounting for technique. BONES/SOFT TISSUES: No aggressive osseous lesion involving the calvarium, skull base, or visualized upper cervical spine. Right frontal craniotomy. The major intracranial vascular flow voids are maintained. ORBITS: No significant abnormality accounting for technique. SINUSES/MASTOIDS: Mild mucosal thickening scattered about the paranasal sinuses. No significant middle ear or mastoid effusion.     1.  Unchanged postoperative and post therapeutic findings. No evidence for progressive neoplasm. 2.  Stable moderate ventriculomegaly. 3.  No superimposed acute intracranial process.     TT: 40 minutes: time consisted of medical record review, examination of patient, completing documentation and counseling time on reviewing MRI results, or surveillance for CNS lymphoma, anemia work-up.    Signed by: Radha Corbin MD

## 2021-05-31 NOTE — TELEPHONE ENCOUNTER
Left detailed message for patient to notify her of low iron results. Encouraged patient to contact scheduling at [phone] to set up 2 doses of iron 1 week apart. Marli Mitchell, CMA

## 2021-06-01 ENCOUNTER — RECORDS - HEALTHEAST (OUTPATIENT)
Dept: ADMINISTRATIVE | Facility: CLINIC | Age: 71
End: 2021-06-01

## 2021-06-01 VITALS — WEIGHT: 194 LBS | BODY MASS INDEX: 30.84 KG/M2

## 2021-06-01 VITALS — WEIGHT: 193 LBS | BODY MASS INDEX: 30.29 KG/M2 | HEIGHT: 67 IN

## 2021-06-01 VITALS — WEIGHT: 193.3 LBS | BODY MASS INDEX: 30.73 KG/M2

## 2021-06-01 VITALS — WEIGHT: 191 LBS | BODY MASS INDEX: 30.37 KG/M2

## 2021-06-01 VITALS — BODY MASS INDEX: 30.69 KG/M2 | HEIGHT: 67 IN | WEIGHT: 195.5 LBS

## 2021-06-02 VITALS — BODY MASS INDEX: 30.29 KG/M2 | HEIGHT: 67 IN | WEIGHT: 193 LBS

## 2021-06-02 VITALS — WEIGHT: 194 LBS | BODY MASS INDEX: 30.84 KG/M2

## 2021-06-02 VITALS — BODY MASS INDEX: 30.8 KG/M2 | WEIGHT: 193.7 LBS

## 2021-06-03 VITALS — WEIGHT: 196.6 LBS | BODY MASS INDEX: 31.26 KG/M2

## 2021-06-03 VITALS — BODY MASS INDEX: 31.16 KG/M2 | WEIGHT: 196 LBS

## 2021-06-03 VITALS — WEIGHT: 196.06 LBS | BODY MASS INDEX: 31.17 KG/M2

## 2021-06-04 VITALS
DIASTOLIC BLOOD PRESSURE: 63 MMHG | HEART RATE: 68 BPM | OXYGEN SATURATION: 97 % | SYSTOLIC BLOOD PRESSURE: 101 MMHG | BODY MASS INDEX: 32.36 KG/M2 | WEIGHT: 194.2 LBS | HEIGHT: 65 IN

## 2021-06-04 VITALS
SYSTOLIC BLOOD PRESSURE: 137 MMHG | DIASTOLIC BLOOD PRESSURE: 66 MMHG | WEIGHT: 196.5 LBS | HEART RATE: 75 BPM | BODY MASS INDEX: 32.7 KG/M2 | OXYGEN SATURATION: 98 % | TEMPERATURE: 97.9 F

## 2021-06-05 VITALS
BODY MASS INDEX: 31.9 KG/M2 | OXYGEN SATURATION: 99 % | SYSTOLIC BLOOD PRESSURE: 118 MMHG | WEIGHT: 191.7 LBS | HEART RATE: 76 BPM | DIASTOLIC BLOOD PRESSURE: 62 MMHG
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
## 2021-06-08 NOTE — PROGRESS NOTES
I met with Mariela today to introduce myself and explain my role.  She talked of getting ready to get her house on the market.  Listening support provided.  I told her if she has any questions or needs to feel free to call me.  She agrees.
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
## 2021-06-08 NOTE — TELEPHONE ENCOUNTER
Refill Approved    Rx renewed per Medication Renewal Policy. Medication was last renewed on 3/23/19.11/20/19.    Jacquelyn Norman, [facility] Triage/Med Refill 6/1/2020     Requested Prescriptions   Pending Prescriptions Disp Refills     hydroCHLOROthiazide (HYDRODIURIL) 25 MG tablet [Pharmacy Med Name: HYDROCHLOROTHIAZIDE 25MG TABLETS] 90 tablet 1     Sig: TAKE 1 TABLET(25 MG) BY MOUTH DAILY       Diuretics/Combination Diuretics Refill Protocol  Passed - 5/29/2020 12:05 PM        Passed - Visit with PCP or prescribing provider visit in past 12 months     Last office visit with prescriber/PCP: 1/27/2020 Carlota Maradiaga MD OR same dept: Visit date not found OR same specialty: 2/14/2019 Tamia Sher FNP  Last physical: Visit date not found Last MTM visit: Visit date not found   Next visit within 3 mo: Visit date not found  Next physical within 3 mo: Visit date not found  Prescriber OR PCP: Carlota Maradiaga MD  Last diagnosis associated with med order: 1. Essential hypertension  - hydroCHLOROthiazide (HYDRODIURIL) 25 MG tablet [Pharmacy Med Name: HYDROCHLOROTHIAZIDE 25MG TABLETS]; TAKE 1 TABLET(25 MG) BY MOUTH DAILY  Dispense: 90 tablet; Refill: 1    2. HLD (hyperlipidemia)  - atorvastatin (LIPITOR) 40 MG tablet [Pharmacy Med Name: ATORVASTATIN 40MG TABLETS]; TAKE 1/2 TABLET BY MOUTH EVERY DAY  Dispense: 45 tablet; Refill: 3    If protocol passes may refill for 12 months if within 3 months of last provider visit (or a total of 15 months).             Passed - Serum Potassium in past 12 months      Lab Results   Component Value Date    Potassium 3.6 01/27/2020             Passed - Serum Sodium in past 12 months      Lab Results   Component Value Date    Sodium 141 01/27/2020             Passed - Blood pressure on file in past 12 months     BP Readings from Last 1 Encounters:   01/27/20 101/63             Passed - Serum Creatinine in past 12 months      Creatinine   Date Value Ref Range Status    01/27/2020 1.18 (H) 0.60 - 1.10 mg/dL Final                atorvastatin (LIPITOR) 40 MG tablet [Pharmacy Med Name: ATORVASTATIN 40MG TABLETS] 45 tablet 3     Sig: TAKE 1/2 TABLET BY MOUTH EVERY DAY       Statins Refill Protocol (Hmg CoA Reductase Inhibitors) Passed - 5/29/2020 12:05 PM        Passed - PCP or prescribing provider visit in past 12 months      Last office visit with prescriber/PCP: 1/27/2020 Carlota Maradiaga MD OR same dept: Visit date not found OR same specialty: 2/14/2019 Tamia Sher FNP  Last physical: Visit date not found Last MTM visit: Visit date not found   Next visit within 3 mo: Visit date not found  Next physical within 3 mo: Visit date not found  Prescriber OR PCP: Carlota Maradiaga MD  Last diagnosis associated with med order: 1. Essential hypertension  - hydroCHLOROthiazide (HYDRODIURIL) 25 MG tablet [Pharmacy Med Name: HYDROCHLOROTHIAZIDE 25MG TABLETS]; TAKE 1 TABLET(25 MG) BY MOUTH DAILY  Dispense: 90 tablet; Refill: 1    2. HLD (hyperlipidemia)  - atorvastatin (LIPITOR) 40 MG tablet [Pharmacy Med Name: ATORVASTATIN 40MG TABLETS]; TAKE 1/2 TABLET BY MOUTH EVERY DAY  Dispense: 45 tablet; Refill: 3    If protocol passes may refill for 12 months if within 3 months of last provider visit (or a total of 15 months).

## 2021-06-08 NOTE — PROGRESS NOTES
Unable to cancel the cdiff that was ordered in the year 2015. Speciman was not collected and not needed. Labs drawn and immunizations given.

## 2021-06-09 NOTE — PROGRESS NOTES
This is a new consult for Varicose Veins. The patient has varicose veins that are problematic in bilateral legs. Symptoms patient has been experiencing are aching, tiredness, cramps, discoloration and  swelling. Patienthas not been wearing compression stockings. L/S Dr. Cabral in 12/28/2016. ELI's normal at that time.

## 2021-06-09 NOTE — PROGRESS NOTES
Assessment:     1. varicose veins, bilateral   2. spider veins, bilateral   3. Bilateral greater saphenous vein insuffiencey    Plan:     1. Treatment options of conservative therapy of stockings use, exercise, weight loss,  elevating legs when possible.    2. Script for compression stockings 20-30 mm hg  3. Ultrasound to evaluate legs for incompetency of both deep and superficial system .   4. Surgical treatment   Endovenous closure ofbilateral, greater saphenous vein   Risks and benefits of surgical intervention including infection, burns, dvt,  thrombophlebitis, not closing, recurrence, numbness and nerve injury and need  for further intervention were all discused    5. Follow up: 3 months.   6. Call for any questions concerns or issues    Subjective:      Mariela Jane is a 66 y.o. female  who was referred by MAYI Portillo  for evaluation of varicose veins. Symptoms include pain, aching, fatigue, burning, edema, dermatitis and episodes of superficial thrombophlebitis. Patient has history of leg selling, pain and vein issues that have progressed. Pain and symptoms have affected daily living and work activities needing medications. Here for evaluation today. no stocking or compression devic use    Allergies:Hydralazine and Pentamidine isethionate    Past Medical History:   Diagnosis Date     Anemia     While at [facility]     CNS lymphoma 2014     GERD (gastroesophageal reflux disease)      Multiple rib fractures March 2014     Pelvic fracture march 2014     Primary CNS lymphoma      Sinusitis        Past Surgical History:   Procedure Laterality Date     CATARACT EXTRACTION, BILATERAL Bilateral 2013     HYSTERECTOMY       OOPHORECTOMY      1 removed, 1 remains     NC ARTHROPLASTY TIBIAL PLATEAU      Description: Knee Replacement;  Recorded: 04/11/2012;     NC REMOVAL OF OVARY(S)      Description: Oophorectomy;  Recorded: 12/08/2011;  Comments: one intact-one with fibroma     NC REVISE MEDIAN N/CARPAL TUNNEL SURG    "   Description: Neuroplasty Decompression Median Nerve At Carpal Tunnel;  Recorded: 01/26/2009;     [place] VAGINAL HYSTERECTOMY,UTERUS 250 GMS/<      Description: Vaginal Hysterectomy;  Recorded: 12/08/2011;     TOTAL KNEE ARTHROPLASTY Left 2002       Current Outpatient Prescriptions   Medication Sig     atorvastatin (LIPITOR) 40 MG tablet Take 0.5 tablets (20 mg total) by mouth daily.     calcium-vitamin D (CALCIUM-VITAMIN D) 500 mg(1,250mg) -200 unit per tablet Take 1 tablet by mouth 2 (two) times a day.      ferrous sulfate 325 (65 FE) MG tablet Take 1 tablet by mouth 3 (three) times a day with meals.     fexofenadine (ALLEGRA) 180 MG tablet Take 180 mg by mouth daily.     losartan (COZAAR) 50 MG tablet TAKE 1 TABLET BY MOUTH ONCE DAILY     pseudoephedrine (SUDAFED) 30 MG tablet Take 30 mg by mouth every 4 (four) hours as needed for congestion (AS NEEDED FOR SINUS CONGESTION OR SINUS HEADACHE).     ranitidine (ZANTAC) 150 MG tablet Take 150 mg by mouth 2 (two) times a day as needed for heartburn.       Family History   Problem Relation Age of Onset     Diabetes Mother      Diabetes Maternal Aunt      Diabetes Maternal Uncle         reports that she quit smoking about 32 years ago. Her smoking use included Cigarettes. She has a 25.00 pack-year smoking history. She has never used smokeless tobacco. She reports that she does not drink alcohol or use illicit drugs.      Review of Systems  Pertinent items are noted in HPI.  A 12 point comprehensive review of systems was negative except as noted.      Objective:     Vitals:    02/21/17 0922   BP: 126/80   Patient Site: Right Arm   Patient Position: Sitting   Cuff Size: Adult Large   Pulse: 72   Resp: 18   Weight: 198 lb (89.8 kg)   Height: 5' 6.5\" (1.689 m)     Body mass index is 31.48 kg/(m^2).    EXAM:  GENERAL: This is a well-developed 66 y.o. female who appears her stated age  HEAD: normocephalic  HEENT: Pupils equal and reactive bilaterally  MOUTH: mucus membranes " intact. Normal dentation  CARDIAC: RRR without murmur  CHEST/LUNG:  Clear to auscultation bilaterally  ABDOMEN: Soft, nontender, nondistended, no masses noted   NEUROLOGIC: Focally intact, nonfocal, alert and oriented x 3  INTEGUMENT: No open lesions or ulcers  VASCULAR: Pulses intact, symmetrical upper and lower extremities. There areskin changes consistent with chronic venous insufficiency. Varicose veins present in bilateral greater saphenous distribution. Spider veins present bilateral.    Imaging:    US Venous Insufficiency Legs Bilateral (Order [id number])   Imaging   Date: 2/21/2017 Department: [facility] Ultrasound [place] Released By: Adan Liu RDMS, RVT Authorizing: Roman Nguyen MD   Study Result   [facility]  EXAM: BILATERAL LOWER EXTREMITY VENOUS DUPLEX WITH INCOMPETENCY TESTING  2/21/2017 10:42 AM     INDICATION: Symptomatic varicose veins. Lower extremity pain and swelling. Assess for incompetent veins.   TECHNIQUE: Gray-scale, duplex, and spectral Doppler ultrasound done of the bilateral lower extremity veins. Spectral Doppler ultrasound done with provocative maneuvers.   COMPARISON: 11/13/2008     INCOMPETENCY CRITERIA: Deep vein reflux reported when greater than 1,000 ms flow reversal. Superficial vein reflux reported when greater than 600 ms flow reversal.  vein reflux reported as greater than 350 ms flow reversal.     FINDINGS:   RIGHT LOWER EXTREMITY: Normal compressibility of the common femoral, profunda femoris, femoral, popliteal, and visualized posterior tibial veins. Incompetent common femoral and upper femoral veins. On the previous exam there was incompetency of the   common femoral, femoral competent popliteal and posterior tibial veins. Incompetent great saphenous vein at the knee and calf. The previous exam the great saphenous vein was diffusely incompetent. Competent small saphenous vein down to the mid calf.     GSV measurements:  Junction 8 mm, proximal thigh 5 mm, mid thigh 6 mm, knee 9 mm, mid calf 4 mm, distal calf 3 mm.     LEFT LOWER EXTREMITY: Normal compressibility of the common femoral, profunda femoris, femoral, popliteal, and visualized posterior tibial veins.Incompetent common femoral and femoral veins. On the previous exam only the common femoral vein was   incompetent. Stable, diffusely incompetent great saphenous vein from the saphenofemoral junction to the distal calf. Stable small saphenous vein incompetency.     GSV measurements: Junction 8 mm, proximal thigh 6 mm, mid thigh 6 mm, knee 3 mm, calf 4 mm.  SSV measurements: Saphenopopliteal junction 9 mm, mid calf 3 mm.     IMPRESSION:   CONCLUSION:   1. RIGHT LOWER EXTREMITY: No DVT. Less extensive deep system incompetency and GSV incompetency.  2. LEFT LOWER EXTREMITY: No DVT. New incompetency of the femoral vein. Stable common femoral vein incompetency. Stable great and small saphenous vein incompetency.         Roman Nguyen MD  [facility] Surgery Dept.

## 2021-06-10 ENCOUNTER — COMMUNICATION - HEALTHEAST (OUTPATIENT)
Dept: ADMINISTRATIVE | Facility: HOSPITAL | Age: 71
End: 2021-06-10

## 2021-06-10 NOTE — PROGRESS NOTES
[facility] Hematology and Oncology Progress Note    Patient: Mariela Jane  MRN: [id number]  Date of Service: 8/25/2020        Reason for Visit    Primary CNS lymphoma     Assessment and Plan  Cancer Staging  No matching staging information was found for the patient.    ECOG Performance   ECOG Performance Status: 2     Distress Assessment  Distress Assessment Score: 2    Pain  Currently in Pain: Yes  Pain Score (Initial OR Reassessment): 1  Location: back - chronic      #.  Primary CNS lymphoma      She is clinically well.  No new neurologic symptoms.  Labs were unremarkable.  MRI brain was reviewed with the patient and it showed no evidence of recurrent CNS lymphoma.  She is very glad to hear that.  She is now about 6 years out from completion of treatment.   According to the NCCN guidelines, we will continue to follow annually with exam, labs and MRI.  She is advised to call me sooner with any concerns.     #. H/o Iron deficiency anemia   She had an EGD, colonoscopy and capsule endoscopy in March 2016 at [facility] and all were unremarkable.  She also had a PET scan on the same day and it did not identify any active lymphoma.  She has a mild asymmetric fullness in the right base with SUV 5.6 likely to be inflammatory and low FDG activity right level II cervical lymph nodes with SUV 3.5 likely to be reactive.She was advised to continue on iron supplementation lifelong per her hematologist at [facility].     She received IV iron a year ago and it showed improvement to normalization of hemoglobin a couple months after.  She admitted that she has not been taking iron supplements but she takes woman's multivitamin with iron.  No occult or obvious bleeding.  I explained to him that it does not look like sufficient for her iron deficiency.     I recommended IV iron 2 doses 1 week apart.  And to recheck her hemoglobin and iron study in about 2 months and about 7 months from now to assure stability.  I also reminded her  "that she might need IV iron periodically as she does not tolerate oral iron due to GI side effects.       #.  History of pulmonary embolism, diagnosed in June 2014.     - treated with anticoagulation.   - She is fully aware of signs and symptoms of venous thromboembolism and time to seek medical attention.     Problem List    1. Primary CNS lymphoma (H)     2. Iron deficiency  Ferritin   3. 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  Presented with 2 months history of headache and blurred and double vision.  April 2014 to June 2014-completed 3 cycles of induction MRT (methotrexate was discontinued after 3 cycles due to prolonged profound cytopenia) at [facility]  9/3/2014- underwent autologous stem cell transplantation with conditioning chemotherapy with BCNU and thiotepa at [facility].    History of Present Illness    Mariela presented herself today.  She is overall doing well.  She has ongoing chronic bladder incontinence issues.  No new neurologic symptoms.  No new headaches. .    Pain Status  Currently in Pain: Yes    Review of Systems    Constitutional  Constitutional (WDL): Exceptions to WDL  Fatigue: Fatigue not relieved by rest - Limiting instrumental ADL  Neurosensory  Neurosensory (WDL): Exceptions to WDL  Peripheral Motor Neuropathy: Asymptomatic, clinical or diagnostic observations only, intervention not indicated  Ataxia: Asymptomatic, clinical or diagnostic observations only, intervention not indicated  Peripheral Sensory Neuropathy: Asymptomatic, loss of deep tendon reflexes or paresthesia(hands - occ \"carpal tunnel')  Eye   Eye Disorder (WDL): Exceptions to WDL  Blurred Vision: Intervention not indicated(occ.)  Ear  Ear Disorder (WDL): Exceptions to WDL  Tinnitus: Mild symptoms, intervention not " indicated  Cardiovascular  Cardiovascular (WDL): Exceptions to WDL  Edema: Yes  Edema Limbs: 5 - 10% inter-limb discrepancy in volume or circumference at point of greatest visible difference, swelling or obscuration of anatomic architecture on close inspection  Pulmonary  Respiratory (WDL): Exceptions to WDL  Cough: Mild symptoms, nonprescription intervention indicated(occ, mild)  Dyspnea: Shortness of breath with moderate exertion  Gastrointestinal  Gastrointestinal (WDL): Exceptions to WDL  Diarrhea: Increase of <4 stools per day over baseline, mild increase in ostomy output compared to baseline(incontinent)  Genitourinary  Genitourinary (WDL): Exceptions to WDL(incontinent)  Lymphatic  Lymph (WDL): All lymph disorder elements are within defined limits  Musculoskeletal and Connective Tissue  Musculoskeletal and Connetive Tissue Disorders (WDL): Exceptions to WDL  Arthralgia: Mild pain  Bone Pain: Mild pain  Muscle Weakness : Symptomatic, perceived by patient but not evident on physical exam  Myalgia: Mild pain  Integumentary  Integumentary (WDL): All integumentary elements are within defined limits  Patient Coping  Patient Coping: Open/discussion  Distress Assessment  Distress Assessment Score: 2  Accompanied by  Accompanied by: Alone  Oral Chemo Adherence       Past History  Past Medical History:   Diagnosis Date     Anemia     While at [place]     CNS lymphoma (H) 2014     GERD (gastroesophageal reflux disease)      Multiple rib fractures March 2014     Pelvic fracture (H) march 2014     Primary CNS lymphoma (H)      Sinusitis        Physical Exam    Recent Vitals 8/25/2020   Height -   Weight 196 lbs 8 oz   BSA (m2) 2.02 m2   /66   Pulse 75   Temp 97.9   Temp src 1   SpO2 98   Some recent data might be hidden     General: alert, awake, not in acute distress  HEENT: Head: Normal, normocephalic, atraumatic.  Eye: Normal external eye, conjunctiva, lids cornea, DHRUV.  Ears:  Non-tender.  Nose: Normal external  nose, mucus membranes and septum.  Pharynx: Dental Hygiene adequate. Normal buccal mucosa. Normal pharynx.  Neck / Thyroid: Supple, no masses, nodes, nodules or enlargement.  Lymphatics: No abnormally enlarged lymph nodes.  Chest: Normal chest wall and respirations. Clear to auscultation.  Heart: S1 S2 RRR, no murmur.   Abdomen: abdomen is soft without significant tenderness, masses, organomegaly or guarding  Extremities: normal strength, tone, and muscle mass  Skin: normal. no rash or abnormalities  CNS: non focal.      Lab Results    Recent Results (from the past 168 hour(s))   POCT CREATININE   Result Value Ref Range    iSTAT Creatinine 1.3 (H) 0.6 - 1.1 mg/dL    iSTAT GFR MDRD Af Amer 49 (L) >60 mL/min/1.73m2    iSTAT GFR MDRD Non Af Amer 40 (L) >60 mL/min/1.73m2   HM1 (CBC with Diff)   Result Value Ref Range    WBC 5.6 4.0 - 11.0 thou/uL    RBC 3.57 (L) 3.80 - 5.40 mill/uL    Hemoglobin 9.2 (L) 12.0 - 16.0 g/dL    Hematocrit 30.6 (L) 35.0 - 47.0 %    MCV 86 80 - 100 fL    MCH 25.8 (L) 27.0 - 34.0 pg    MCHC 30.1 (L) 32.0 - 36.0 g/dL    RDW 15.1 (H) 11.0 - 14.5 %    Platelets 365 140 - 440 thou/uL    MPV 9.0 8.5 - 12.5 fL    Neutrophils % 66 50 - 70 %    Lymphocytes % 24 20 - 40 %    Monocytes % 6 2 - 10 %    Eosinophils % 3 0 - 6 %    Basophils % 0 0 - 2 %    Immature Granulocyte % 0 <=0 %    Neutrophils Absolute 3.7 2.0 - 7.7 thou/uL    Lymphocytes Absolute 1.3 0.8 - 4.4 thou/uL    Monocytes Absolute 0.4 0.0 - 0.9 thou/uL    Eosinophils Absolute 0.2 0.0 - 0.4 thou/uL    Basophils Absolute 0.0 0.0 - 0.2 thou/uL    Immature Granulocyte Absolute 0.0 <=0.0 thou/uL       Imaging    Mr Brain With Without Contrast    Result Date: 8/21/2020  EXAM: MR BRAIN W WO CONTRAST LOCATION: [facility] DATE/TIME: 8/20/2020 5:58 PM INDICATION: Primary CNS lymphoma posttreatment follow-up. COMPARISON: 08/12/2019, 02/07/2019, 08/02/2018 brain MRI evaluations. CONTRAST: Gadavist 9 mL. TECHNIQUE: Routine multiplanar  multisequence head MRI without and with intravenous contrast. FINDINGS: INTRACRANIAL CONTENTS: No acute or subacute infarct. Redemonstrated are postbiopsy changes right frontal freedom hole and right frontal lobe. Chronic focus of blood products just superior to the mid body of the right lateral ventricle redemonstrated series 9 image 13 with Mejía artifact on axial gradient sequence. No additional hemorrhage is identified intracranially. There is no evidence for recurrent/residual neoplasm intracranially with no intracranial mass or enhancement abnormality. No significant perfusion abnormalities noted to suggest recurrent tumor/neoplasm. Stable nonspecific nonenhancing periventricular and pontine deep white matter T2/FLAIR hyperintensity may reflect a combination of post treatment change and/or moderate chronic ischemic small vessel change. Mild generalized cerebral atrophy. No hydrocephalus. Normal position of the cerebellar tonsils. No pathologic contrast enhancement. SELLA: No abnormality accounting for technique. OSSEOUS STRUCTURES/SOFT TISSUES: Normal marrow signal. The major intracranial vascular flow voids are maintained. ORBITS: No abnormality accounting for technique. SINUSES/MASTOIDS: Mucosal thickening primarily involving the ethmoid air cells. Scattered fluid/membrane thickening in the mastoid air cells bilaterally.     1.  Stable appearance from prior studies as above with no evidence for recurrent/residual neoplasm. 2.  Stable postbiopsy changes right frontal lobe and deep white matter region with chronic focus of blood products superior to the body of the right lateral ventricle. 3.  No new hemorrhage identified 4.  No mass effect or abnormal/pathologic intracranial enhancement. 5.  No perfusion abnormalities. 6.  White matter signal change as before may represent a combination of chronic small vessel ischemic change and/or post treatment change. No acute process noted.     TT: 35 minutes: time  consisted of medical record review, examination of patient, completing documentation and counseling time on the lab results, MRI result, option of cancer rehab, ongoing surveillance plan.    Signed by: Radha Corbin MD

## 2021-06-10 NOTE — PROGRESS NOTES
Internal Medicine Office Visit  Patient Name: Mariela Jane  Patient Age: 66 y.o.  YOB: 1950  MRN: [id number]  ?  Date of Visit: 2017  Reason for Office Visit:   Chief Complaint   Patient presents with     Letter for School/Work     Patient is looking for a letter to get out of jury duty       Assessment / Plan / Medical Decision Makin. OAB (overactive bladder)  2. Venous insufficiency  3. Low back pain  -Letter written for patient to submit to [facility] regarding special accommodations needed for her to serve as a juror.  She does not have a medical condition which prevents her from participating but only need special accommodations  - Declines treatment for OAB currently, will avoid coffee/chocolate       Health Maintenance Review  Health Maintenance   Topic Date Due     FALL RISK ASSESSMENT  2017     MAMMOGRAM  01/10/2019     DXA SCAN  01/10/2019     ADVANCE DIRECTIVES DISCUSSED WITH PATIENT  2021     COLONOSCOPY  2025     TD 18+ HE  2027     PNEUMOCOCCAL POLYSACCHARIDE VACCINE AGE 65 AND OVER  Completed     INFLUENZA VACCINE RULE BASED  Completed     PNEUMOCOCCAL CONJUGATE VACCINE FOR ADULTS (PCV13 OR PREVNAR)  Addressed     ZOSTER VACCINE  Addressed         I am having Ms. Jane maintain her calcium-vitamin D, fexofenadine, pseudoephedrine, ranitidine, ferrous sulfate, losartan, and atorvastatin.     HPI:   Encounter Diagnoses   Name Primary?     Venous insufficiency Yes     OAB (overactive bladder)      Low back pain       Mariela Jane is a 66-year-old female who presents to the office today for her completion.  She was summoned for jury duty but has some concerns about being able to serve.  She has symptoms of overactive bladder and has to urinate every 1-2 hours with frequent urinary leakage.  She has to wear a brief at all times due to this risk.  She currently treats this by avoiding caffeinated beverages and chocolate.  She is concerned that as a juror she  may not be able to get to the bathroom in time.    Additionally, she has occasional bowel incontinence.  For many years she has had alternating loose and formed stools.  Colonoscopy in 2015 was normal except for 1 polyp removed per patient report. Again, concerned she would not be able to make it to a bathroom in time.    She is currently being treated for venous insufficiency with lower extremity pain.  If she sits for long periods of time, the swelling worsens and her pain does as well.  She has been advised to avoid prolonged sitting or standing.    Review of Systems: Pertinent findings as in HPI. No hematochezia or melena.     Current Scheduled Meds:  Outpatient Encounter Prescriptions as of 4/13/2017   Medication Sig Dispense Refill     atorvastatin (LIPITOR) 40 MG tablet Take 0.5 tablets (20 mg total) by mouth daily. 90 tablet 1     calcium-vitamin D (CALCIUM-VITAMIN D) 500 mg(1,250mg) -200 unit per tablet Take 1 tablet by mouth 2 (two) times a day.        ferrous sulfate 325 (65 FE) MG tablet Take 1 tablet by mouth 3 (three) times a day with meals. 30 tablet 3     fexofenadine (ALLEGRA) 180 MG tablet Take 180 mg by mouth daily.       losartan (COZAAR) 50 MG tablet TAKE 1 TABLET BY MOUTH ONCE DAILY 90 tablet 2     pseudoephedrine (SUDAFED) 30 MG tablet Take 30 mg by mouth every 4 (four) hours as needed for congestion (AS NEEDED FOR SINUS CONGESTION OR SINUS HEADACHE).       ranitidine (ZANTAC) 150 MG tablet Take 150 mg by mouth 2 (two) times a day as needed for heartburn.       No facility-administered encounter medications on file as of 4/13/2017.      Past Medical History:   Diagnosis Date     Anemia     While at [place]     CNS lymphoma 2014     GERD (gastroesophageal reflux disease)      Multiple rib fractures March 2014     Pelvic fracture march 2014     Primary CNS lymphoma      Sinusitis      Past Surgical History:   Procedure Laterality Date     CATARACT EXTRACTION, BILATERAL Bilateral 2013      HYSTERECTOMY       OOPHORECTOMY      1 removed, 1 remains     IA ARTHROPLASTY TIBIAL PLATEAU      Description: Knee Replacement;  Recorded: 04/11/2012;     IA REMOVAL OF OVARY(S)      Description: Oophorectomy;  Recorded: 12/08/2011;  Comments: one intact-one with fibroma     IA REVISE MEDIAN N/CARPAL TUNNEL SURG      Description: Neuroplasty Decompression Median Nerve At Carpal Tunnel;  Recorded: 01/26/2009;     IA VAGINAL HYSTERECTOMY,UTERUS 250 GMS/<      Description: Vaginal Hysterectomy;  Recorded: 12/08/2011;     TOTAL KNEE ARTHROPLASTY Left 2002     Social History   Substance Use Topics     Smoking status: Former Smoker     Packs/day: 1.00     Years: 25.00     Types: Cigarettes     Quit date: 2/21/1985     Smokeless tobacco: Never Used     Alcohol use No      Comment: occasional       Objective / Physical Examination:  Vitals:    04/13/17 0804   BP: 120/76   Patient Site: Right Arm   Patient Position: Sitting   Cuff Size: Adult Regular   Pulse: 74   Weight: 204 lb 9.6 oz (92.8 kg)     Wt Readings from Last 3 Encounters:   04/13/17 204 lb 9.6 oz (92.8 kg)   02/21/17 198 lb (89.8 kg)   02/07/17 198 lb 8 oz (90 kg)     Body mass index is 32.53 kg/(m^2).     General Appearance: Alert and oriented, cooperative, affect appropriate, speech clear, in no apparent distress    No orders of the defined types were placed in this encounter.  Followup: Return if symptoms worsen or fail to improve. earlier if needed.    Total time spent with patient was 15 with >50% of time spent in face-to-face counseling regarding the above plan and coordination of care.       Tamia Sher, CNP  [facility]

## 2021-06-10 NOTE — PROGRESS NOTES
Pt arrived to infusion clinic. IV access established with great blood return. Pt tolerated Injectafer infusion well. Vitals remained stable throughout visit. IV flushed with saline and then removed, gauze and Coban applied. Pt ambulated out of infusion clinic independently.

## 2021-06-10 NOTE — PROGRESS NOTES
Mariela is here today for ongoing management of CNS Lymphoma. Today is a 1 year f/u with labs and OV with Dr. Corbin; MRI brain 8/20/20. [place]

## 2021-06-11 NOTE — PROGRESS NOTES
Patient here for her 3 month compression follow-up. Patient is a candidate for her left gsv and ssv. Pic needed.

## 2021-06-11 NOTE — PROGRESS NOTES
Internal Medicine Office Visit  Patient Name: Mariela Jane  Patient Age: 67 y.o.  YOB: 1950  MRN: [id number]  ?  Date of Visit: 7/10/2017  Reason for Office Visit:   Chief Complaint   Patient presents with     Follow-up     6 months, HTN anemia       Assessment / Plan / Medical Decision Makin. Venous insufficiency  2. Primary CNS lymphoma  3. OAB (overactive bladder)  4. Essential hypertension  5. Chronic cough  6. Esophageal reflux  - Discontinue losartan as possible cause of cough, start HCTZ 25 mg daily. Follow up in 4 weeks for BP check and cough follow up.   - Consider PPI, will empirically treat cough as above but GERD could be cause of the cough Continue ranitidine for now.   - Reviewed treatment strategies for urge incontinence. She is interested in urology referral in the future but will likely pursue this after her venous ablation. She is doubtful of the efficacy of the treatment strategies we reviewed today regarding pelvic exercises and timed toileting  - Follow up in 6 months     Health Maintenance Review  Health Maintenance   Topic Date Due     FALL RISK ASSESSMENT  2017     INFLUENZA VACCINE RULE BASED (1) 2017     MAMMOGRAM  01/10/2019     DXA SCAN  01/10/2019     ADVANCE DIRECTIVES DISCUSSED WITH PATIENT  2021     COLONOSCOPY  2025     TD 18+ HE  2027     PNEUMOCOCCAL POLYSACCHARIDE VACCINE AGE 65 AND OVER  Completed     PNEUMOCOCCAL CONJUGATE VACCINE FOR ADULTS (PCV13 OR PREVNAR)  Addressed     ZOSTER VACCINE  Addressed           I have discontinued Ms. Jane's losartan. I am also having her start on hydroCHLOROthiazide. Additionally, I am having her maintain her calcium-vitamin D, fexofenadine, pseudoephedrine, ranitidine, ferrous sulfate, and atorvastatin. We will continue to administer (lidocaine 1%-EPINEPHrine 1:100,000 112 mL in sodium chloride 0.9% 1,000 mL (TUMESCENT)).     HPI:   Encounter Diagnoses   Name Primary?     Venous  insufficiency Yes     Primary CNS lymphoma      OAB (overactive bladder)      Essential hypertension      Chronic cough      Esophageal reflux       Mariela Jane is a 66 y/o female who presents to the office today for follow up. She reports that she has now moved in with her niece and has sold her home which is a big financial relief for her.     Cough- Started over 1 year ago, unable to pinpoint exactly when. She had a cough associated with lisinopril in the past and was switched to losartan but does not recall if the cough resolved with the switch.     GERD- takes ranitidine twice daily. Does not notice any heart burn symptoms.     Bladder incontinence- wears depends all the time. Has urinary urgency, rarely makes it to the bathroom on time. Occasional bowel incontinence with urgency; twice in the past 1 year didn't make it to the restroom. Was seen through [facility] urology clinic, was given a handout about OAB. She has reduced her caffeine and chocolate intake.     HTN- well controlled with losartan but will discontinue this medication due to possible SE of cough.     Venous insufficiency- scheduled for venous ablation. She is wearing compression stockings currently.     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 She will follow up with oncology next month. No night sweats, headaches.     Iron deficiency anemia- EGD, colonoscopy, and capsule endoscopy March 2016 at [facility], all unremarkable. She has had normal hemoglobin checks recently. Continues iron supplement.     Review of Systems: pertinent findings as in HPI. She denies any chest pain or SOA    Current Scheduled Meds:  Outpatient Encounter Prescriptions as of 7/10/2017   Medication Sig Dispense Refill     atorvastatin (LIPITOR) 40 MG tablet Take 0.5 tablets (20 mg total) by mouth daily. 90 tablet 1     calcium-vitamin D (CALCIUM-VITAMIN D) 500 mg(1,250mg) -200 unit per tablet Take 1 tablet by mouth 2  (two) times a day.        ferrous sulfate 325 (65 FE) MG tablet Take 1 tablet by mouth 3 (three) times a day with meals. 30 tablet 3     fexofenadine (ALLEGRA) 180 MG tablet Take 180 mg by mouth daily.       pseudoephedrine (SUDAFED) 30 MG tablet Take 30 mg by mouth every 4 (four) hours as needed for congestion (AS NEEDED FOR SINUS CONGESTION OR SINUS HEADACHE).       ranitidine (ZANTAC) 150 MG tablet Take 150 mg by mouth 2 (two) times a day as needed for heartburn.       [DISCONTINUED] losartan (COZAAR) 50 MG tablet TAKE 1 TABLET BY MOUTH ONCE DAILY 90 tablet 2     hydroCHLOROthiazide (HYDRODIURIL) 25 MG tablet Take 1 tablet (25 mg total) by mouth daily. 30 tablet 2     Facility-Administered Encounter Medications as of 7/10/2017   Medication Dose Route Frequency Provider Last Rate Last Dose     lidocaine 1%-EPINEPHrine 1:100,000 112 mL in sodium chloride 0.9% 1,000 mL (TUMESCENT)  1,000 mL Irrigation Q1H PRN Roman Nguyen MD         Past Medical History:   Diagnosis Date     Anemia     While at [place]     CNS lymphoma 2014     GERD (gastroesophageal reflux disease)      Multiple rib fractures March 2014     Pelvic fracture march 2014     Primary CNS lymphoma      Sinusitis      Past Surgical History:   Procedure Laterality Date     CATARACT EXTRACTION, BILATERAL Bilateral 2013     HYSTERECTOMY       OOPHORECTOMY      1 removed, 1 remains     IA ARTHROPLASTY TIBIAL PLATEAU      Description: Knee Replacement;  Recorded: 04/11/2012;     IA REMOVAL OF OVARY(S)      Description: Oophorectomy;  Recorded: 12/08/2011;  Comments: one intact-one with fibroma     IA REVISE MEDIAN N/CARPAL TUNNEL SURG      Description: Neuroplasty Decompression Median Nerve At Carpal Tunnel;  Recorded: 01/26/2009;     IA VAGINAL HYSTERECTOMY,UTERUS 250 GMS/<      Description: Vaginal Hysterectomy;  Recorded: 12/08/2011;     TOTAL KNEE ARTHROPLASTY Left 2002     Social History   Substance Use Topics     Smoking status: Former Smoker      Packs/day: 1.00     Years: 25.00     Types: Cigarettes     Quit date: 2/21/1985     Smokeless tobacco: Never Used     Alcohol use No      Comment: occasional       Objective / Physical Examination:  Vitals:    07/10/17 1301   BP: 130/76   Pulse: 67   Weight: 198 lb (89.8 kg)     Wt Readings from Last 3 Encounters:   07/10/17 198 lb (89.8 kg)   04/13/17 204 lb 9.6 oz (92.8 kg)   02/21/17 198 lb (89.8 kg)     Body mass index is 31.48 kg/(m^2).     General Appearance: Alert and oriented, cooperative, affect appropriate, speech clear, in no apparent distress  Neck: Supple, trachea midline. No carotid bruits   Lungs: Clear to auscultation bilaterally. Normal inspiratory and expiratory effort  Cardiovascular: Regular rate, normal S1, S2  Extremities: 1+ edema      No orders of the defined types were placed in this encounter.  Followup: Return in about 6 months (around 1/10/2018) for Recheck. earlier if needed.         Tamia Sher, CNP  [facility]

## 2021-06-11 NOTE — PROGRESS NOTES
Follow Up: Varicose Veins/ Venous Insufficiency    Mariela Jane is a 66 y.o.  female here for followup. she has worn stockings now for 3 months. I saw her previously in February 2017.  Continued progression of disease and symptoms and issues; reviewed ultrasound results. Patient has ongoing symptoms with pain and swelling needing intervention with pain meds secondary to interfering with daily activities and work.    Allergies:Hydralazine and Pentamidine isethionate    Past Medical History:   Diagnosis Date     Anemia     While at [place]     CNS lymphoma 2014     GERD (gastroesophageal reflux disease)      Multiple rib fractures March 2014     Pelvic fracture march 2014     Primary CNS lymphoma      Sinusitis        Past Surgical History:   Procedure Laterality Date     CATARACT EXTRACTION, BILATERAL Bilateral 2013     HYSTERECTOMY       OOPHORECTOMY      1 removed, 1 remains     GA ARTHROPLASTY TIBIAL PLATEAU      Description: Knee Replacement;  Recorded: 04/11/2012;     GA REMOVAL OF OVARY(S)      Description: Oophorectomy;  Recorded: 12/08/2011;  Comments: one intact-one with fibroma     GA REVISE MEDIAN N/CARPAL TUNNEL SURG      Description: Neuroplasty Decompression Median Nerve At Carpal Tunnel;  Recorded: 01/26/2009;     GA VAGINAL HYSTERECTOMY,UTERUS 250 GMS/<      Description: Vaginal Hysterectomy;  Recorded: 12/08/2011;     TOTAL KNEE ARTHROPLASTY Left 2002       CURRENT MEDS:  Current Outpatient Prescriptions on File Prior to Visit   Medication Sig Dispense Refill     atorvastatin (LIPITOR) 40 MG tablet Take 0.5 tablets (20 mg total) by mouth daily. 90 tablet 1     calcium-vitamin D (CALCIUM-VITAMIN D) 500 mg(1,250mg) -200 unit per tablet Take 1 tablet by mouth 2 (two) times a day.        ferrous sulfate 325 (65 FE) MG tablet Take 1 tablet by mouth 3 (three) times a day with meals. 30 tablet 3     fexofenadine (ALLEGRA) 180 MG tablet Take 180 mg by mouth daily.       losartan (COZAAR) 50 MG tablet TAKE 1  TABLET BY MOUTH ONCE DAILY 90 tablet 2     pseudoephedrine (SUDAFED) 30 MG tablet Take 30 mg by mouth every 4 (four) hours as needed for congestion (AS NEEDED FOR SINUS CONGESTION OR SINUS HEADACHE).       ranitidine (ZANTAC) 150 MG tablet Take 150 mg by mouth 2 (two) times a day as needed for heartburn.       No current facility-administered medications on file prior to visit.        Family History   Problem Relation Age of Onset     Diabetes Mother      Diabetes Maternal Aunt      Diabetes Maternal Uncle         reports that she quit smoking about 32 years ago. Her smoking use included Cigarettes. She has a 25.00 pack-year smoking history. She has never used smokeless tobacco. She reports that she does not drink alcohol or use illicit drugs.    Review of Systems:  Negative except leg pain, swelling, veneous pressure and changes, Otherwise twelve system of review is negative.      OBJECTIVE:  Vitals:    06/06/17 0907   BP: 116/74   Patient Site: Right Arm   Patient Position: Sitting   Cuff Size: Adult Large   Pulse: 72   Resp: 16     There is no height or weight on file to calculate BMI.    EXAM:  GENERAL: This is a well-developed 66 y.o. female who appears her stated age  HEAD: normocephalic  HEENT: Pupils equal and reactive bilaterally  CARDIAC: RRR without murmur  CHEST/LUNG:  Clear to auscultation  ABDOMEN: Soft, nontender, nondistended, no masses    NEUROLOGIC: Focally intact, nonfocal  VASCULAR: Pulses intact, symmetrical upper and lower extremities.    US Venous Insufficiency Legs Bilateral (Order [id number])   Imaging   Date: 2/21/2017 Department: [facility] [place] [facility] [place] Released By: Adan Liu RDMS, RVT Authorizing: Roman Nguyen MD   Study Result   [facility]  EXAM: BILATERAL LOWER EXTREMITY VENOUS DUPLEX WITH INCOMPETENCY TESTING  2/21/2017 10:42 AM     INDICATION: Symptomatic varicose veins. Lower extremity pain and swelling. Assess for incompetent veins.    TECHNIQUE: Gray-scale, duplex, and spectral Doppler ultrasound done of the bilateral lower extremity veins. Spectral Doppler ultrasound done with provocative maneuvers.   COMPARISON: 11/13/2008     INCOMPETENCY CRITERIA: Deep vein reflux reported when greater than 1,000 ms flow reversal. Superficial vein reflux reported when greater than 600 ms flow reversal.  vein reflux reported as greater than 350 ms flow reversal.     FINDINGS:   RIGHT LOWER EXTREMITY: Normal compressibility of the common femoral, profunda femoris, femoral, popliteal, and visualized posterior tibial veins. Incompetent common femoral and upper femoral veins. On the previous exam there was incompetency of the   common femoral, femoral competent popliteal and posterior tibial veins. Incompetent great saphenous vein at the knee and calf. The previous exam the great saphenous vein was diffusely incompetent. Competent small saphenous vein down to the mid calf.     GSV measurements: Junction 8 mm, proximal thigh 5 mm, mid thigh 6 mm, knee 9 mm, mid calf 4 mm, distal calf 3 mm.     LEFT LOWER EXTREMITY: Normal compressibility of the common femoral, profunda femoris, femoral, popliteal, and visualized posterior tibial veins.Incompetent common femoral and femoral veins. On the previous exam only the common femoral vein was   incompetent. Stable, diffusely incompetent great saphenous vein from the saphenofemoral junction to the distal calf. Stable small saphenous vein incompetency.     GSV measurements: Junction 8 mm, proximal thigh 6 mm, mid thigh 6 mm, knee 3 mm, calf 4 mm.  SSV measurements: Saphenopopliteal junction 9 mm, mid calf 3 mm.     IMPRESSION:   CONCLUSION:   1.  RIGHT LOWER EXTREMITY: No DVT. Less extensive deep system incompetency and GSV incompetency.  2.  LEFT LOWER EXTREMITY: No DVT. New incompetency of the femoral vein. Stable common femoral vein incompetency. Stable great and small saphenous vein incompetency.         She  has  incompetency and insuffiencey of the left  greater and short  saphenous vein.  Left GSV measures 9 mm at the junction. Deep systems are intact. No DVTs. Great candidate for endovenous  closure. We spent counseling time more than 50% of visit: 20 minutes today and discussed the procedure. The risks of anesthesia, infection, bleeding, clotting, DVTs, numbess at the insertion site dermatome and  the process and procedure were discussed. Answered all questions today. Will submit this to Mariela Jane's insurance if needed for pre approval.Will set this up when approved. Discussed need to have a  and procedure woul take around 30 minutes with total time 2-3 hours. Also understands a small screening ultrasound 2-3 days out to rule out clot formation at the closed vessel.    DIAGNOSIS: Venous insufficiency of the left  greater and short} saphenous vein .     PROCEDURE: Endovenous closure of the left greater saphenous vein and left short saphenous vein    Roman Nguyen MD  [facility] Surgery Dept.

## 2021-06-12 NOTE — TELEPHONE ENCOUNTER
The patient's message stated she is unable to get into mychart. Attempted to call her 2x and left message for return call. Rose Warren

## 2021-06-12 NOTE — PROGRESS NOTES
[facility] Hematology and Oncology Progress Note    Patient: Mariela Jane  MRN: [id number]  Date of Service: 08/08/2017        Reason for Visit    Follow-up on previously treated primary CNS lymphoma    Assessment and Plan  No matching staging information was found for the patient.    ECOG Performance   ECOG Performance Status: 0     Distress Assessment  Distress Assessment Score: No distress    Pain  Currently in Pain: Yes  Pain Score (Initial OR Reassessment): 2  Location: upper back    A 67 y.o. female with history of primary CNS lymphoma initially diagnosed in 2014, status post induction chemotherapy with MRT followed by auto locus stem cell transplant on 9/3/14 at [facility].       1.  Primary CNS lymphoma status post chemotherapy with MRT followed by auto stem cell transplant on 9/3/14 at [facility]   - Her last MRI brain on February 2017 showed no evidence of recurrent lymphoma.  According to the NCCN guidelines, I will follow her with exam and MRI brain with contrast every 6 months until September 2019, then annually for at least 5 years.   - Requested MRI brain with and without contrast.     - RTC 6 months with MRI brain prior.   - She is advised to call us if there is any new concerns or symptoms.     2.  Iron deficiency anemia   - She had an EGD, colonoscopy and capsule endoscopy in March 2016 at [facility] and all were unremarkable.  She also had a PET scan on the same day and it did not identify any active lymphoma.  She has a mild asymmetric fullness in the right base with SUV 5.6 likely to be inflammatory and low FDG activity right level II cervical lymph nodes with SUV 3.5 likely to be reactive.   - She is taking iron supplementation ( ferrous sulfate 325 mg 3 times a day).   -We will follow-up hemogram next time.     3.  History of pulmonary embolism, diagnosed in June 2014.     - treated with anticoagulation.   - She is fully aware of signs and symptoms of venous thromboembolism and time to  seek medical attention.     4.  Posttransplant immunization.     - She is due for 2nd dose of hepatitis A today.     5. HCM   - mammogram is up to date 1/2017 and negative.   - colonoscopy 2016   - She is encouraged to eat a balanced healthy diet.  She is also encouraged to have routine exercises.    Problem List    1. 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  Presented with 2 months history of headache and blurred and double vision.  April 2014 to June 2014-completed 3 cycles of induction MRT (methotrexate was discontinued after 3 cycles due to prolonged profound cytopenia)  9/3/2014- underwent autologous stem cell transplantation with conditioning chemotherapy with BCNU and thiotepa        History of Present Illness    Mariela is doing very well.  She is sold her house in 2 days and she is very happy that she sold at the price more than she listed.  Now [facility] and she enjoyed living in one level.  She denies any headaches, or vision changes.  Her appetite is good.  No fever or drenching night sweats.  She is trying to lose weight and lost about 4 pounds.  She has intermittent tingling numbness of both hands from carpal tunnel syndrome.    Pain Status  Currently in Pain: Yes    Review of Systems    Constitutional  Constitutional (WDL): Exceptions to WDL  Fatigue: None  Fever: None  Chills: None  Weight Gain: None  Weight Loss: to <10% from baseline, intervention not indicated (4# since 7/10/17)  Neurosensory  Neurosensory (WDL): Exceptions to WDL  Peripheral Motor Neuropathy: None  Ataxia: Asymptomatic, clinical or diagnostic observations only, intervention not indicated  (intermittent)  Peripheral Sensory Neuropathy: Asymptomatic, loss of deep tendon reflexes or paresthesia (intermittent, bilat hands and constant bilat feet)  Confusion: None  Syncope: None  Eye   Eye Disorder (WDL): All eye disorder elements are within defined limits  Ear  Ear Disorder (WDL): Exceptions to WDL  Ear Pain: Mild Pain (R ear pain yesterday.  Denies now.)  Tinnitus: Mild symptoms, intervention not indicated (chronic)  Cardiovascular  Cardiovascular (WDL): Exceptions to WDL  Palpitations: Definition: A disorder characterized by inflammation of the muscle tissue of the heart.  Edema: Yes  Edema Limbs: 5 - 10% inter-limb discrepancy in volume or circumference at point of greatest visible difference, swelling or obscuration of anatomic architecture on close inspection (wears compression stockings)  Pulmonary  Respiratory (WDL): Exceptions to WDL  Cough: None  Dyspnea: Shortness of breath with moderate exertion  Hypoxia: None  Gastrointestinal  Gastrointestinal (WDL): Exceptions to WDL  Anorexia: None  Constipation: None  Diarrhea: None  Dysphagia: Symptomatic, able to eat regular diet (sore throat on right side-last night.  Gargled with salt water resolved today.)  Esophagitis: Asymptomatic, clinical or diagnostic observations only, intervention not indicated (meds help)  Nausea: None  Pharyngitis: None  Vomiting: None  Dysgeusia: None  Dry Mouth: None  Genitourinary  Genitourinary (WDL): Exceptions to WDL (incon-wears depends/not new)  Lymphatic  Lymph (WDL): All lymph disorder elements are within defined limits  Musculoskeletal and Connective Tissue  Musculoskeletal and Connetive Tissue Disorders (WDL): All Musculoskeletal and Connetive Tissue Disorder elements are within defined limits  Integumentary  Integumentary (WDL): All integumentary elements are within defined limits  Patient Coping  Patient Coping: Accepting  Distress Assessment  Distress Assessment Score: No distress  Accompanied by  Accompanied  by: Alone  Oral Chemo Adherence       Past History  Past Medical History:   Diagnosis Date     Anemia     While at [place]     CNS lymphoma 2014     GERD (gastroesophageal reflux disease)      Multiple rib fractures March 2014     Pelvic fracture march 2014     Primary CNS lymphoma      Sinusitis        Physical Exam    Recent Vitals 8/8/2017   Height -   Weight -   BSA (m2) -   /60   Pulse 75   Temp -   Temp src -   SpO2 98   Some recent data might be hidden     General: alert, awake, not in acute distress  HEENT: Head: Normal, normocephalic, atraumatic.  Eye: Normal external eye, conjunctiva, lids cornea, DHRUV.  Ears:  Non-tender.  Nose: Normal external nose, mucus membranes and septum.  Pharynx: Dental Hygiene adequate. Normal buccal mucosa. Normal pharynx.  Neck / Thyroid: Supple, no masses, nodes, nodules or enlargement.  Lymphatics: No abnormally enlarged lymph nodes.  Chest: Normal chest wall and respirations. Clear to auscultation.  Heart: S1 S2 RRR, no murmur.   Abdomen: abdomen is soft without significant tenderness, masses, organomegaly or guarding  Extremities: normal strength, tone, and muscle mass  Skin: normal. no rash or abnormalities  CNS: non focal.        Lab Results    Recent Results (from the past 168 hour(s))   Basic Metabolic Panel   Result Value Ref Range    Sodium 143 136 - 145 mmol/L    Potassium 4.1 3.5 - 5.0 mmol/L    Chloride 104 98 - 107 mmol/L    CO2 26 22 - 31 mmol/L    Anion Gap, Calculation 13 5 - 18 mmol/L    Glucose 116 70 - 125 mg/dL    Calcium 10.0 8.5 - 10.5 mg/dL    BUN 21 8 - 22 mg/dL    Creatinine 1.15 (H) 0.60 - 1.10 mg/dL    GFR MDRD Af Amer 57 (L) >60 mL/min/1.73m2    GFR MDRD Non Af Amer 47 (L) >60 mL/min/1.73m2       Imaging    No results found.      Signed by: Radha Corbin MD

## 2021-06-12 NOTE — TELEPHONE ENCOUNTER
Mariela calls today to get an update on her labs from yesterday, CBC and iron studies. Will discuss with Dr. Corbin and update. Rose Warren      Recent Results (from the past 72 hour(s))   Ferritin   Result Value Ref Range    Ferritin 371 (H) 10 - 130 ng/mL   Iron and Transferrin Iron Binding Capacity   Result Value Ref Range    Iron 109 42 - 175 ug/dL    Transferrin 233 212 - 360 mg/dL    Transferrin Saturation, Calculated 37 20 - 50 %    Transferrin IBC, Calculated 291 (L) 313 - 563 ug/dL   HM2(CBC w/o Differential)   Result Value Ref Range    WBC 7.3 4.0 - 11.0 thou/uL    RBC 4.58 3.80 - 5.40 mill/uL    Hemoglobin 13.6 12.0 - 16.0 g/dL    Hematocrit 42.6 35.0 - 47.0 %    MCV 93 80 - 100 fL    MCH 29.7 27.0 - 34.0 pg    MCHC 31.9 (L) 32.0 - 36.0 g/dL    RDW 18.0 (H) 11.0 - 14.5 %    Platelets 332 140 - 440 thou/uL    MPV 9.1 8.5 - 12.5 fL

## 2021-06-12 NOTE — PROGRESS NOTES
Internal Medicine Office Visit  Patient Name: Mariela Jane  Patient Age: 67 y.o.  YOB: 1950  MRN: [id number]  ?  Date of Visit: 2017  Reason for Office Visit:   Chief Complaint   Patient presents with     Follow-up     cough, BP       Assessment / Plan / Medical Decision Makin. Chronic kidney disease, stage II (mild)  2. Cough  3. HTN  -Cough has now resolved with the discontinuation of losartan.  She is not experiencing any worsening in overactive bladder symptoms but surprisingly states that her symptoms have improved since starting a mild diuretic.  She will continue with hydrochlorothiazide.  Recheck BMP today  -Follow-up in the office in 6 months        Health Maintenance Review  Health Maintenance   Topic Date Due     FALL RISK ASSESSMENT  2017     INFLUENZA VACCINE RULE BASED (1) 2017     MAMMOGRAM  01/10/2019     DXA SCAN  01/10/2019     ADVANCE DIRECTIVES DISCUSSED WITH PATIENT  2021     COLONOSCOPY  2025     TD 18+ HE  2027     PNEUMOCOCCAL POLYSACCHARIDE VACCINE AGE 65 AND OVER  Completed     PNEUMOCOCCAL CONJUGATE VACCINE FOR ADULTS (PCV13 OR PREVNAR)  Addressed     ZOSTER VACCINE  Addressed         I am having Ms. Jane maintain her calcium-vitamin D, fexofenadine, pseudoephedrine, ranitidine, ferrous sulfate, atorvastatin, and hydroCHLOROthiazide. We will continue to administer (lidocaine 1%-EPINEPHrine 1:100,000 112 mL in sodium chloride 0.9% 1,000 mL (TUMESCENT)).     HPI:   Encounter Diagnoses   Name Primary?     Chronic kidney disease, stage II (mild) Yes     Cough      Essential hypertension       Mariela Jane is a 67-year-old female who presents to the office today for follow-up of a cough.  Her losartan was discontinued and she was started on hydrochlorothiazide instead.  Her blood pressure remains well controlled and she reports that the cough has not resolved.  She has a history of overactive bladder symptoms and reports that the  hydrochlorothiazide has actually improved this issue and she is not getting up as frequently during the night to urinate.  She is very happy with the outcome and has no additional concerns today.    Review of Systems: Pertinent findings as noted above    Current Scheduled Meds:  Outpatient Encounter Prescriptions as of 8/8/2017   Medication Sig Dispense Refill     atorvastatin (LIPITOR) 40 MG tablet Take 0.5 tablets (20 mg total) by mouth daily. 90 tablet 1     calcium-vitamin D (CALCIUM-VITAMIN D) 500 mg(1,250mg) -200 unit per tablet Take 1 tablet by mouth 2 (two) times a day.        ferrous sulfate 325 (65 FE) MG tablet Take 1 tablet by mouth 3 (three) times a day with meals. 30 tablet 3     fexofenadine (ALLEGRA) 180 MG tablet Take 180 mg by mouth daily.       hydroCHLOROthiazide (HYDRODIURIL) 25 MG tablet Take 1 tablet (25 mg total) by mouth daily. 90 tablet 3     pseudoephedrine (SUDAFED) 30 MG tablet Take 30 mg by mouth every 4 (four) hours as needed for congestion (AS NEEDED FOR SINUS CONGESTION OR SINUS HEADACHE).       ranitidine (ZANTAC) 150 MG tablet Take 150 mg by mouth 2 (two) times a day as needed for heartburn.       [DISCONTINUED] hydroCHLOROthiazide (HYDRODIURIL) 25 MG tablet Take 1 tablet (25 mg total) by mouth daily. 30 tablet 2     Facility-Administered Encounter Medications as of 8/8/2017   Medication Dose Route Frequency Provider Last Rate Last Dose     lidocaine 1%-EPINEPHrine 1:100,000 112 mL in sodium chloride 0.9% 1,000 mL (TUMESCENT)  1,000 mL Irrigation Q1H PRN Roman Nguyen MD         Past Medical History:   Diagnosis Date     Anemia     While at [place]     CNS lymphoma 2014     GERD (gastroesophageal reflux disease)      Multiple rib fractures March 2014     Pelvic fracture march 2014     Primary CNS lymphoma      Sinusitis      Past Surgical History:   Procedure Laterality Date     CATARACT EXTRACTION, BILATERAL Bilateral 2013     HYSTERECTOMY       OOPHORECTOMY      1 removed, 1  remains     NM ARTHROPLASTY TIBIAL PLATEAU      Description: Knee Replacement;  Recorded: 04/11/2012;     NM REMOVAL OF OVARY(S)      Description: Oophorectomy;  Recorded: 12/08/2011;  Comments: one intact-one with fibroma     NM REVISE MEDIAN N/CARPAL TUNNEL SURG      Description: Neuroplasty Decompression Median Nerve At Carpal Tunnel;  Recorded: 01/26/2009;     NM VAGINAL HYSTERECTOMY,UTERUS 250 GMS/<      Description: Vaginal Hysterectomy;  Recorded: 12/08/2011;     TOTAL KNEE ARTHROPLASTY Left 2002     Social History   Substance Use Topics     Smoking status: Former Smoker     Packs/day: 1.00     Years: 25.00     Types: Cigarettes     Quit date: 2/21/1985     Smokeless tobacco: Never Used     Alcohol use No      Comment: occasional       Objective / Physical Examination:  Vitals:    08/08/17 1133   BP: 124/70   Pulse: 66   SpO2: 97%   Weight: 194 lb (88 kg)     Wt Readings from Last 3 Encounters:   08/08/17 194 lb (88 kg)   07/10/17 198 lb (89.8 kg)   04/13/17 204 lb 9.6 oz (92.8 kg)     Body mass index is 30.84 kg/(m^2).     General Appearance: Alert and oriented, cooperative, affect appropriate, speech clear, in no apparent distress  Lungs: Normal inspiratory and expiratory effort.  No cough in the office      Orders Placed This Encounter   Procedures     Basic Metabolic Panel   Followup: Return in about 6 months (around 2/8/2018) for Next scheduled follow up. earlier if needed.    Tamia Sher, KEV  [facility]

## 2021-06-12 NOTE — TELEPHONE ENCOUNTER
Called patient to update as below:    Albert Sierra,   Your iron level now is sufficient. Will plan to recheck in March as planned.     Please call me with any questions.     Thanks.   Dr. Corbin

## 2021-06-14 NOTE — TELEPHONE ENCOUNTER
Pt called stated she is scheduled to get the covid 19 vaccine tomorrow and is one cephalexin 500 mg by mouth 4 times a day, and she has one day left, pt is wondering if she needs to stop taking the antibiotic before her covid 19 vaccine tomorrow, or is it okay to continue taking the antibiotic despite covid 19 vaccine? PCP, please advice      RN called clinic via [facility], spoke with patricia , and she said she will ask Dr Maradiaga, Patricia stated is is something that we don't know but provider advised pt to continue taking the antibiotic and get the covid 19 vaccine.      RN called pt back and informed her to continue taking the cephalexin and get the covid 19 vaccine tomorrow, pt stated okay.      Carlos A Fowler RN  [facility]  COVID 19 Nurse Triage Plan/Patient Instructions    Please be aware that novel coronavirus (COVID-19) may be circulating in the community. If you develop symptoms such as fever, cough, or SOB or if you have concerns about the presence of another infection including coronavirus (COVID-19), please contact your health care provider or visit [email or web address].     Disposition/Instructions    Home care recommended. Follow home care protocol based instructions.    Thank you for taking steps to prevent the spread of this virus.  o Limit your contact with others.  o Wear a simple mask to cover your cough.  o Wash your hands well and often.    Resources    [facility]: About COVID-19: [email or web address]    CDC: What to Do If You're Sick: www.cdc.gov/coronavirus/2019-ncov/about/steps-when-sick.html    CDC: Ending Home Isolation: www.cdc.gov/coronavirus/2019-ncov/hcp/disposition-in-home-patients.html     CDC: Caring for Someone: www.cdc.gov/coronavirus/2019-ncov/if-you-are-sick/care-for-someone.html     [facility]: Interim Guidance for Hospital Discharge to Home: www.health.[facility].mn.us/diseases/coronavirus/hcp/hospdischarge.pdf    [facility] clinical trials  (COVID-19 research studies): [email or web address]-trials     Below are the COVID-19 hotlines at the [place] Department of [facility] ([facility]). Interpreters are available.   o For health questions: Call 345-323-1408 or 1-158.181.1156 (7 a.m. to 7 p.m.)  o For questions about schools and childcare: Call 398-789-7081 or 1-637.969.1406 (7 a.m. to 7 p.m.)       Reason for Disposition    Caller has medication question only, adult not sick, and triager answers question    Protocols used: MEDICATION QUESTION CALL-A-[place]

## 2021-06-14 NOTE — PROGRESS NOTES
Internal Medicine Office Visit  [facility]  Patient Name: Mariela Jane  Patient Age: 67 y.o.  YOB: 1950  MRN: [id number]    Date of Visit: 2017  Reason for Office Visit:   Chief Complaint   Patient presents with     Cough     since , hasn't been getting better           Assessment / Plan / Medical Decision Makin. Bronchitis  2. Cough  - Benzonatate as needed for cough during daytime hours. Reserve cough syrup with codeine for use at night. Follow up if symptoms worsen or fail to improve  - Influenza vaccine today       Health Maintenance Review  Health Maintenance   Topic Date Due     FALL RISK ASSESSMENT  2017     MAMMOGRAM  01/10/2019     DXA SCAN  01/10/2019     ADVANCE DIRECTIVES DISCUSSED WITH PATIENT  2021     COLONOSCOPY  2025     TD 18+ HE  2027     PNEUMOCOCCAL POLYSACCHARIDE VACCINE AGE 65 AND OVER  Completed     INFLUENZA VACCINE RULE BASED  Completed     PNEUMOCOCCAL CONJUGATE VACCINE FOR ADULTS (PCV13 OR PREVNAR)  Addressed     ZOSTER VACCINE  Addressed         I am having Ms. Jane start on codeine-guaiFENesin and benzonatate. I am also having her maintain her calcium-vitamin D, fexofenadine, pseudoephedrine, ranitidine, ferrous sulfate, atorvastatin, and hydroCHLOROthiazide. We will continue to administer (lidocaine 1%-EPINEPHrine 1:100,000 112 mL in sodium chloride 0.9% 1,000 mL (TUMESCENT)).      HPI:  Mariela Jane is a 67 y.o. year old who presents to the office today for concerns of a cough.     Cough started before , it is occasionally productive. Sometimes the phlegm is green or yellow. Her chest feels heavy, she had had to blow her nose. At night the cough keeps her awake. She has tried a treatment of some essential oils that she rubs onto her chest which seems to help her breath. Cough drops during the day. She has felt chilled.     Review of Systems- pertinent positive in  bold:  No body aches, shortness of breath, chest pain       Current Scheduled Meds:  Outpatient Encounter Prescriptions as of 12/7/2017   Medication Sig Dispense Refill     atorvastatin (LIPITOR) 40 MG tablet Take 0.5 tablets (20 mg total) by mouth daily. 90 tablet 1     calcium-vitamin D (CALCIUM-VITAMIN D) 500 mg(1,250mg) -200 unit per tablet Take 1 tablet by mouth 2 (two) times a day.        ferrous sulfate 325 (65 FE) MG tablet Take 1 tablet by mouth 2 (two) times a day with meals.  30 tablet 3     fexofenadine (ALLEGRA) 180 MG tablet Take 180 mg by mouth daily.       hydroCHLOROthiazide (HYDRODIURIL) 25 MG tablet Take 1 tablet (25 mg total) by mouth daily. 90 tablet 3     pseudoephedrine (SUDAFED) 30 MG tablet Take 30 mg by mouth every 4 (four) hours as needed for congestion (AS NEEDED FOR SINUS CONGESTION OR SINUS HEADACHE).       ranitidine (ZANTAC) 150 MG tablet Take 150 mg by mouth 2 (two) times a day as needed for heartburn.       benzonatate (TESSALON PERLES) 100 MG capsule Take 1 capsule (100 mg total) by mouth every 6 (six) hours as needed for cough. 30 capsule 0     codeine-guaiFENesin (GUAIFENESIN AC)  mg/5 mL liquid Take 10 mL by mouth at bedtime as needed for cough. 240 mL 0     Facility-Administered Encounter Medications as of 12/7/2017   Medication Dose Route Frequency Provider Last Rate Last Dose     lidocaine 1%-EPINEPHrine 1:100,000 112 mL in sodium chloride 0.9% 1,000 mL (TUMESCENT)  1,000 mL Irrigation Q1H PRN Roman Nguyen MD         Past Medical History:   Diagnosis Date     Anemia     While at [place]     CNS lymphoma 2014     GERD (gastroesophageal reflux disease)      Multiple rib fractures March 2014     Pelvic fracture march 2014     Primary CNS lymphoma      Sinusitis      Past Surgical History:   Procedure Laterality Date     CATARACT EXTRACTION, BILATERAL Bilateral 2013     HYSTERECTOMY       OOPHORECTOMY      1 removed, 1 remains     WI ARTHROPLASTY TIBIAL PLATEAU       Description: Knee Replacement;  Recorded: 04/11/2012;     MA REMOVAL OF OVARY(S)      Description: Oophorectomy;  Recorded: 12/08/2011;  Comments: one intact-one with fibroma     MA REVISE MEDIAN N/CARPAL TUNNEL SURG      Description: Neuroplasty Decompression Median Nerve At Carpal Tunnel;  Recorded: 01/26/2009;     MA VAGINAL HYSTERECTOMY,UTERUS 250 GMS/<      Description: Vaginal Hysterectomy;  Recorded: 12/08/2011;     TOTAL KNEE ARTHROPLASTY Left 2002     Social History   Substance Use Topics     Smoking status: Former Smoker     Packs/day: 1.00     Years: 25.00     Types: Cigarettes     Quit date: 2/21/1985     Smokeless tobacco: Never Used     Alcohol use No      Comment: occasional       Objective / Physical Examination:  Vitals:    12/07/17 1454   BP: 120/68   Pulse: 60   Weight: 193 lb (87.5 kg)     Wt Readings from Last 3 Encounters:   12/07/17 193 lb (87.5 kg)   08/08/17 194 lb (88 kg)   07/10/17 198 lb (89.8 kg)     Body mass index is 30.68 kg/(m^2).     General Appearance: Alert and oriented, cooperative, affect appropriate, speech clear, in no apparent distress  Head: Normocephalic, atraumatic. No sinus percussion tenderness   Ears: Tympanic membrane clear with landmarks well visualized bilaterally  Throat: Lips and mucosa moist. Pharynx without erythema or exudate  Neck: Supple, trachea midline. No cervical adenopathy  Lungs: Clear to auscultation bilaterally. Normal inspiratory and expiratory effort. No wheezing or crackles   Cardiovascular: Regular rate, normal S1, S2      Orders Placed This Encounter   Procedures     Influenza High Dose, Seasonal 65+ yrs   Followup: Return if symptoms worsen or fail to improve. earlier if needed.        Tamia Sher, CNP

## 2021-06-14 NOTE — PROGRESS NOTES
"1. Sebaceous cyst  cephalexin (KEFLEX) 500 MG capsule   2. Visit for screening mammogram  Mammo Screening Bilateral   3. Osteopenia of multiple sites  DXA Bone Density Scan       Assessment & Plan     Sebaceous cyst    - cephalexin (KEFLEX) 500 MG capsule  Dispense: 40 capsule; Refill: 0  Recommend Tylenol PRN FOR discomfort along with some ice packs if swelling continues    Visit for screening mammogram    - Mammo Screening Bilateral  Continue with monthly BSE    Osteopenia of multiple sites    - DXA Bone Density Scan  Overdue, history of osteopenia        23 minutes spent on the date of the encounter doing chart review, patient visit and documentation          BMI:   Estimated body mass index is 31.9 kg/m  as calculated from the following:    Height as of 1/27/20: 5' 5\" (1.651 m).    Weight as of this encounter: 191 lb 11.2 oz (87 kg).   The following high BMI interventions were performed this visit: exercise promotion: stretching      Return in about 2 weeks (around 2/2/2021), or if symptoms worsen or fail to improve, for cyst.    Ana Pickett NP  [facility]     Marielaolivia Jane is 70 y.o. and presents to clinic today for the following health issues   HPI       Concern - raised red lump on right side of breast  Onset: 10 days  Description: painful to touch with redness and swelling  Intensity: mild and 1/10  Progression of Symptoms:  improving  Accompanying Signs & Symptoms: no other symptoms, denies fevers since lump presented  Previous history of similar problem: no  Precipitating factors:        Worsened by: laying on right side  Alleviating factors:        Improved by: has not tried anything over the counter for pain control, has not tried ice or heat packs  Therapies tried and outcome: None    She is overdue for her mammogram and bone scan      Review of Systems    Denies fever/chills/sore throat/rhinorrhea/ear pain/swollen glands/shortness of breath/discomfort " of chest/abdominal pain/changes in bowel habits/urinary symptoms/rash.        Objective    /62   Pulse 76   Wt 191 lb 11.2 oz (87 kg)   LMP  (LMP Unknown)   SpO2 99%   BMI 31.90 kg/m    Body mass index is 31.9 kg/m .  Physical Exam           Objective:         /62   Pulse 76   Wt 191 lb 11.2 oz (87 kg)   LMP  (LMP Unknown)   SpO2 99%   BMI 31.90 kg/m       Physical Exam:  General Appearance: Alert, cooperative, no distress, appears stated age  Head: Normocephalic, without obvious abnormality, atraumatic  Neck: Supple, symmetrical, trachea midline, no adenopathy;  thyroid: not enlarged, symmetric, no tenderness/mass/nodules; no carotid bruit or JVD  Lungs: Clear to auscultation bilaterally, respirations unlabored  Breasts: No breast masses, tenderness, asymmetry, or nipple discharge. Breast exam carefully reviewed with patient.  Heart: Regular rate and rhythm, S1 and S2 normal, no murmur, rub, or gallop  Skin: Skin color, texture, turgor normal, 1.5 cm x 1.5 cm sebaceous cyst noted on right side of breast. Mildly raised with moderate erythema present. No drainage noted, no head formed on cyst.   Lymph nodes: Cervical, supraclavicular, and axillary nodes normal

## 2021-06-15 PROBLEM — M54.50 LOW BACK PAIN: Status: ACTIVE | Noted: 2017-04-13

## 2021-06-15 PROBLEM — N32.81 OAB (OVERACTIVE BLADDER): Status: ACTIVE | Noted: 2017-04-13

## 2021-06-15 PROBLEM — M16.50 POST-TRAUMATIC OSTEOARTHRITIS OF HIP: Status: ACTIVE | Noted: 2017-01-04

## 2021-06-15 NOTE — PROGRESS NOTES
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    ECOG Performance   ECOG Performance Status: 1     Distress Assessment  Distress Assessment Score: No distress    Pain  Currently in Pain: No/denies    A 67 y.o. 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       1.  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  According to the NCCN guidelines, I will follow her with exam and MRI brain with contrast every 6 months until September 2019, then annually for at least 5 years.   - RTC 6 months with MRI brain prior.   - She is advised to call us if there is any new concerns or symptoms.     2.  Iron deficiency anemia   - She had an EGD, colonoscopy and capsule endoscopy in March 2016 at [facility] and all were unremarkable.  She also had a PET scan on the same day and it did not identify any active lymphoma.  She has a mild asymmetric fullness in the right base with SUV 5.6 likely to be inflammatory and low FDG activity right level II cervical lymph nodes with SUV 3.5 likely to be reactive.   - She is taking iron supplementation ( ferrous sulfate 325 mg 3 times a day).   -Her hemogram is in normal range today with normal indices.  Patient stated that she was advised to continue on iron supplementation lifelong per her hematologist at [facility].     3.  History of pulmonary embolism, diagnosed in June 2014.     - treated with anticoagulation.   - She is fully aware of signs and  symptoms of venous thromboembolism and time to seek medical attention.     4.  Venous insufficiency of bilateral lower extremity (left> right)   She is advised to follow-up with Dr. Nguyen as she was previously seen.    5. HCM   - mammogram annually and follow with her PCP.   - colonoscopy 2016   - She is encouraged to eat a balanced healthy diet.  She is also encouraged to have routine exercises.    Problem List    1. Primary CNS lymphoma  HM1(CBC and Differential)    Comprehensive Metabolic Panel    MR Brain With Without Contrast      ______________________________________________________________________________  Cancer history  March 2014: 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  Presented with 2 months history of headache and blurred and double vision.  April 2014 to June 2014-completed 3 cycles of induction MRT (methotrexate was discontinued after 3 cycles due to prolonged profound cytopenia)  9/3/2014- underwent autologous stem cell transplantation with conditioning chemotherapy with BCNU and thiotepa    History of Present Illness    Mariela is doing very well.    She denies any new health concern.  She continues to have bilateral hand and feet tingling and numbness.  She has venous insufficiency in her both feet and followed by healthy surgery.  She was discussed about endovenous closure on the left lower extremity in June 2017 and she is awaiting insurance approval.  She is using essential while and it helps to some degree.  No open wounds.  No evidence of infection.    Pain Status  Currently in Pain: No/denies    Review of Systems    Constitutional  Constitutional (WDL): All constitutional elements are within defined limits  Neurosensory  Neurosensory (WDL): Exceptions to WDL  Peripheral Motor Neuropathy: Asymptomatic, clinical or diagnostic observations only, intervention not indicated  Ataxia: Asymptomatic, clinical or diagnostic observations only, intervention not  indicated  Peripheral Sensory Neuropathy: Asymptomatic, loss of deep tendon reflexes or paresthesia (bilat hands and feet)  Confusion: None  Syncope: None  Eye   Eye Disorder (WDL): Exceptions to WDL  Blurred Vision: None  Dry Eye: Asymptomatic, clinical or diagnostic observations only, mild symptoms relieved by lubricants  Eye Pain: None  Watering Eyes: None  Ear  Ear Disorder (WDL): Exceptions to WDL  Ear Pain: None  Tinnitus: Mild symptoms, intervention not indicated (constant, chronic-unchanged)  Cardiovascular  Cardiovascular (WDL): Exceptions to WDL  Palpitations: None  Edema: Yes  Edema Limbs: 5 - 10% inter-limb discrepancy in volume or circumference at point of greatest visible difference, swelling or obscuration of anatomic architecture on close inspection (bilat LE/wears compression stockings)  Phlebitis: None  Superficial thrombophlebitis: None  Pulmonary  Respiratory (WDL): Exceptions to WDL  Cough: Mild symptoms, nonprescription intervention indicated (occ, productive-clear)  Dyspnea: None  Hypoxia: None  Gastrointestinal  Gastrointestinal (WDL): Exceptions to WDL  Anorexia: None  Constipation: None  Diarrhea: Increase of <4 stools per day over baseline, mild increase in ostomy output compared to baseline (pastey-brown/no blood)  Dysphagia: None  Esophagitis: Symptomatic, altered eating/swallowing, oral supplements indicated (controlled with ranitidine)  Nausea: None  Pharyngitis: None  Vomiting: None  Dysgeusia: None  Dry Mouth: None  Genitourinary  Genitourinary (WDL): Exceptions to WDL (incontinence-wears depends)  Urinary Frequency: None  Urinary Retention: None  Urinary Tract Pain: None  Lymphatic  Lymph (WDL): All lymph disorder elements are within defined limits  Musculoskeletal and Connective Tissue  Musculoskeletal and Connetive Tissue Disorders (WDL): Exceptions to WDL  Arthralgia: Mild pain  Bone Pain: None  Muscle Weakness : None  Myalgia: Mild pain  Integumentary  Integumentary (WDL):  "Exceptions to WDL (feet purple discoloration.  L>R)  Patient Coping  Patient Coping: Accepting  Distress Assessment  Distress Assessment Score: No distress  Accompanied by  Accompanied by: Alone  Oral Chemo Adherence       Past History  Past Medical History:   Diagnosis Date     Anemia     While at [place]     CNS lymphoma 2014     GERD (gastroesophageal reflux disease)      Multiple rib fractures March 2014     Pelvic fracture march 2014     Primary CNS lymphoma      Sinusitis        Physical Exam    Recent Vitals 2/6/2018   Height 5' 6.5\"   Weight 193 lbs   BSA (m2) 2.03 m2   /63   Pulse 75   Temp -   Temp src -   SpO2 95   Some recent data might be hidden     General: alert, awake, not in acute distress  HEENT: Head: Normal, normocephalic, atraumatic.  Eye: Normal external eye, conjunctiva, lids cornea, DHRUV.  Ears:  Non-tender.  Nose: Normal external nose, mucus membranes and septum.  Pharynx: Dental Hygiene adequate. Normal buccal mucosa. Normal pharynx.  Neck / Thyroid: Supple, no masses, nodes, nodules or enlargement.  Lymphatics: No abnormally enlarged lymph nodes.  Chest: Normal chest wall and respirations. Clear to auscultation.  Heart: S1 S2 RRR, no murmur.   Abdomen: abdomen is soft without significant tenderness, masses, organomegaly or guarding  Extremities: normal strength, tone, and muscle mass  Skin: Left foot from toes to the midfoot with deep purplish color.  No open wounds.  Palpable pulse.  Right foot has slight erythema will but not to a severe degree as left foot.  She is wearing compressive stockings.  CNS: non focal.        Lab Results    Recent Results (from the past 168 hour(s))   Comprehensive Metabolic Panel   Result Value Ref Range    Sodium 143 136 - 145 mmol/L    Potassium 3.6 3.5 - 5.0 mmol/L    Chloride 102 98 - 107 mmol/L    CO2 29 22 - 31 mmol/L    Anion Gap, Calculation 12 5 - 18 mmol/L    Glucose 120 70 - 125 mg/dL    BUN 22 8 - 22 mg/dL    Creatinine 1.20 (H) 0.60 - 1.10 " mg/dL    GFR MDRD Af Amer 54 (L) >60 mL/min/1.73m2    GFR MDRD Non Af Amer 45 (L) >60 mL/min/1.73m2    Bilirubin, Total 0.5 0.0 - 1.0 mg/dL    Calcium 9.7 8.5 - 10.5 mg/dL    Protein, Total 7.9 6.0 - 8.0 g/dL    Albumin 3.7 3.5 - 5.0 g/dL    Alkaline Phosphatase 93 45 - 120 U/L    AST 18 0 - 40 U/L    ALT 14 0 - 45 U/L   HM1 (CBC with Diff)   Result Value Ref Range    WBC 7.3 4.0 - 11.0 thou/uL    RBC 4.63 3.80 - 5.40 mill/uL    Hemoglobin 14.2 12.0 - 16.0 g/dL    Hematocrit 43.4 35.0 - 47.0 %    MCV 94 80 - 100 fL    MCH 30.7 27.0 - 34.0 pg    MCHC 32.7 32.0 - 36.0 g/dL    RDW 13.2 11.0 - 14.5 %    Platelets 322 140 - 440 thou/uL    MPV 9.3 8.5 - 12.5 fL    Neutrophils % 74 (H) 50 - 70 %    Lymphocytes % 18 (L) 20 - 40 %    Monocytes % 5 2 - 10 %    Eosinophils % 2 0 - 6 %    Basophils % 1 0 - 2 %    Neutrophils Absolute 5.4 2.0 - 7.7 thou/uL    Lymphocytes Absolute 1.3 0.8 - 4.4 thou/uL    Monocytes Absolute 0.4 0.0 - 0.9 thou/uL    Eosinophils Absolute 0.1 0.0 - 0.4 thou/uL    Basophils Absolute 0.0 0.0 - 0.2 thou/uL       Imaging    Mr Brain With Without Contrast    Result Date: 2/1/2018  [facility] HEAD MRI WITHOUT AND WITH IV CONTRAST 2/1/2018 2:27 PM INDICATION: Primary CNS lymphoma TECHNIQUE: Head MRI without and with intravenous contrast. CONTRAST: Gadavist 8.5 COMPARISON:  Head MRI 8/9/2017 FINDINGS: No acute infarct/restricted diffusion. No mass. Stable right frontal blood products in the biopsy bed. Stable mild-to-moderate ventriculomegaly. Stable moderate presumed chronic small vessel ischemic change. Stable mild periventricular T2 prolongation. Stable right frontal subcortical T2 prolongation without mass effect. No abnormal contrast enhancement. The pituitary gland is unremarkable. The major intracranial vascular flow voids are maintained. No acute bony abnormality. Bilateral cataract resections. The paranasal sinuses are free of significant disease. The mastoid air cells are clear. Normal  parotid gland.     CONCLUSION: 1.  No evidence of disease recurrence. 2.  Stable postbiopsy changes in the right frontal lobe. 3.  Stable mild-to-moderate ventriculomegaly.         Signed by: Radha Corbin MD

## 2021-06-15 NOTE — PROGRESS NOTES
Internal Medicine Office Visit  [facility]  Patient Name: Mariela Jane  Patient Age: 67 y.o.  YOB: 1950  MRN: [id number]    Date of Visit: 2018  Reason for Office Visit:   Chief Complaint   Patient presents with     Follow-up     6 months           Assessment / Plan / Medical Decision Makin. Venous insufficiency  2. Essential hypertension  3. HLD (hyperlipidemia)  4. Primary CNS lymphoma  - Any current medications  - Fasting lab work  - Schedule follow up with vascular care   - 6 month follow up      Health Maintenance Review  Health Maintenance   Topic Date Due     FALL RISK ASSESSMENT  2017     MAMMOGRAM  01/10/2019     DXA SCAN  01/10/2019     ADVANCE DIRECTIVES DISCUSSED WITH PATIENT  2021     COLONOSCOPY  2025     TD 18+ HE  2027     PNEUMOCOCCAL POLYSACCHARIDE VACCINE AGE 65 AND OVER  Completed     INFLUENZA VACCINE RULE BASED  Completed     PNEUMOCOCCAL CONJUGATE VACCINE FOR ADULTS (PCV13 OR PREVNAR)  Addressed     ZOSTER VACCINE  Addressed         I am having Ms. Jane maintain her calcium-vitamin D, fexofenadine, pseudoephedrine, ranitidine, ferrous sulfate, UNABLE TO FIND, (BOSWELLIA SUGAR EXTRACT (BOSWELLIA SUGAR XT, BULK, MISC)), UNABLE TO FIND, UNABLE TO FIND, hydroCHLOROthiazide, and atorvastatin. We will continue to administer (lidocaine 1%-EPINEPHrine 1:100,000 112 mL in sodium chloride 0.9% 1,000 mL (TUMESCENT)).      HPI:  Mariela Jane is a 67 y.o. year old who presents to the office today for 6 month follow up of chronic medical conditions.     She has a history of venous insufficiency. She has burning in the feet and ankles.  She was supposed to get a phone call regarding a venous ablation but has not yet heard from the vascular center.  She is concerned about the discoloration in her left lower extremity in particular.  She is encouraged to call the office to follow-up as this was likely an oversight  and it is documented that she was to be contacted for scheduling the procedure.  She agrees to do so.    Cough has improved since she had the duct work in her house cleaned.     GERD- takes ranitidine twice daily. Does not notice any heart burn symptoms.      There is a history of primary CNS lymphoma.  She is now followed by heme/onc and is doing well, remains in remission.       Review of Systems- pertinent positive in bold:  A 10-point ROS is negative except as stated in HPI       Current Scheduled Meds:  Outpatient Encounter Prescriptions as of 2/8/2018   Medication Sig Dispense Refill     BOSWELLIA SUGAR EXTRACT (BOSWELLIA SUGAR XT, BULK, MISC) Use As Directed.       calcium-vitamin D (CALCIUM-VITAMIN D) 500 mg(1,250mg) -200 unit per tablet Take 1 tablet by mouth 2 (two) times a day.        ferrous sulfate 325 (65 FE) MG tablet Take 1 tablet by mouth 2 (two) times a day with meals.  30 tablet 3     fexofenadine (ALLEGRA) 180 MG tablet Take 180 mg by mouth daily.       hydroCHLOROthiazide (HYDRODIURIL) 25 MG tablet Take 1 tablet (25 mg total) by mouth daily. 90 tablet 3     pseudoephedrine (SUDAFED) 30 MG tablet Take 30 mg by mouth every 4 (four) hours as needed for congestion (AS NEEDED FOR SINUS CONGESTION OR SINUS HEADACHE).       ranitidine (ZANTAC) 150 MG tablet Take 150 mg by mouth 2 (two) times a day.        UNABLE TO FIND Herbal medications for venous insufficiency and arithritis       UNABLE TO FIND Med Name:Vericare       UNABLE TO FIND Med Name: [facility]       [DISCONTINUED] hydroCHLOROthiazide (HYDRODIURIL) 25 MG tablet Take 1 tablet (25 mg total) by mouth daily. 90 tablet 3     atorvastatin (LIPITOR) 40 MG tablet Take 0.5 tablets (20 mg total) by mouth daily. 90 tablet 3     [DISCONTINUED] atorvastatin (LIPITOR) 40 MG tablet Take 0.5 tablets (20 mg total) by mouth daily. 90 tablet 1     Facility-Administered Encounter Medications as of 2/8/2018   Medication Dose Route Frequency Provider Last  Rate Last Dose     lidocaine 1%-EPINEPHrine 1:100,000 112 mL in sodium chloride 0.9% 1,000 mL (TUMESCENT)  1,000 mL Irrigation Q1H PRN Roman Nguyen MD         Past Medical History:   Diagnosis Date     Anemia     While at [place]     CNS lymphoma 2014     GERD (gastroesophageal reflux disease)      Multiple rib fractures March 2014     Pelvic fracture march 2014     Primary CNS lymphoma      Sinusitis      Past Surgical History:   Procedure Laterality Date     CATARACT EXTRACTION, BILATERAL Bilateral 2013     HYSTERECTOMY       OOPHORECTOMY      1 removed, 1 remains     AK ARTHROPLASTY TIBIAL PLATEAU      Description: Knee Replacement;  Recorded: 04/11/2012;     AK REMOVAL OF OVARY(S)      Description: Oophorectomy;  Recorded: 12/08/2011;  Comments: one intact-one with fibroma     AK REVISE MEDIAN N/CARPAL TUNNEL SURG      Description: Neuroplasty Decompression Median Nerve At Carpal Tunnel;  Recorded: 01/26/2009;     AK VAGINAL HYSTERECTOMY,UTERUS 250 GMS/<      Description: Vaginal Hysterectomy;  Recorded: 12/08/2011;     TOTAL KNEE ARTHROPLASTY Left 2002     Social History   Substance Use Topics     Smoking status: Former Smoker     Packs/day: 1.00     Years: 25.00     Types: Cigarettes     Quit date: 2/21/1985     Smokeless tobacco: Never Used     Alcohol use No      Comment: occasional       Objective / Physical Examination:  Vitals:    02/08/18 1249   BP: 120/70   Patient Site: Right Arm   Patient Position: Sitting   Cuff Size: Adult Regular   Pulse: 62   Weight: 193 lb 4.8 oz (87.7 kg)     Wt Readings from Last 3 Encounters:   02/08/18 193 lb 4.8 oz (87.7 kg)   02/06/18 193 lb (87.5 kg)   12/07/17 193 lb (87.5 kg)     Body mass index is 30.73 kg/(m^2).     General Appearance: Alert and oriented, cooperative, affect appropriate, speech clear, in no apparent distress  Neck: Supple, trachea midline. No carotid bruits   Lungs: Clear to auscultation bilaterally. Normal inspiratory and expiratory  effort  Cardiovascular: Regular rate, normal S1, S2. No murmurs, rubs, or gallops  Extremities: Pulses 2+ and equal throughout. No edema. Venous discoloration bilaterally with purple discoloration left foot          Orders Placed This Encounter   Procedures     Glucose     JIC LAV     Lipid Profile     Glucose   Followup: Return in about 6 months (around 8/8/2018) for Next scheduled follow up. earlier if needed.        Tamia Sher, CNP

## 2021-06-16 PROBLEM — N18.30 CKD (CHRONIC KIDNEY DISEASE) STAGE 3, GFR 30-59 ML/MIN (H): Status: ACTIVE | Noted: 2019-08-19

## 2021-06-16 PROBLEM — E61.1 IRON DEFICIENCY: Status: ACTIVE | Noted: 2019-08-16

## 2021-06-16 PROBLEM — T50.905S: Status: ACTIVE | Noted: 2019-08-16

## 2021-06-16 NOTE — TELEPHONE ENCOUNTER
Refill Approved    Rx renewed per Medication Renewal Policy. Medication was last renewed on 6/1/20.    Mauricio Robbins, [facility] Triage/Med Refill 3/22/2021     Requested Prescriptions   Pending Prescriptions Disp Refills     atorvastatin (LIPITOR) 40 MG tablet [Pharmacy Med Name: ATORVASTATIN 40MG TABLETS] 45 tablet 2     Sig: TAKE 1/2 TABLET BY MOUTH EVERY DAY       Statins Refill Protocol (Hmg CoA Reductase Inhibitors) Passed - 3/21/2021  8:22 PM        Passed - PCP or prescribing provider visit in past 12 months      Last office visit with prescriber/PCP: 1/27/2020 Carlota Maradiaga MD OR same dept: Visit date not found OR same specialty: 2/14/2019 Tamia Sher FNP  Last physical: Visit date not found Last MTM visit: Visit date not found   Next visit within 3 mo: Visit date not found  Next physical within 3 mo: Visit date not found  Prescriber OR PCP: Carlota Maradiaga MD  Last diagnosis associated with med order: 1. HLD (hyperlipidemia)  - atorvastatin (LIPITOR) 40 MG tablet [Pharmacy Med Name: ATORVASTATIN 40MG TABLETS]; TAKE 1/2 TABLET BY MOUTH EVERY DAY  Dispense: 45 tablet; Refill: 2    If protocol passes may refill for 12 months if within 3 months of last provider visit (or a total of 15 months).

## 2021-06-16 NOTE — TELEPHONE ENCOUNTER
Refill Approved    Rx renewed per Medication Renewal Policy. Medication was last renewed on 6/1/20.    Mauricio Robbins, [facility] Triage/Med Refill 3/24/2021     Requested Prescriptions   Pending Prescriptions Disp Refills     hydroCHLOROthiazide (HYDRODIURIL) 25 MG tablet 90 tablet 2     Sig: Take 1 tablet (25 mg total) by mouth daily.       Diuretics/Combination Diuretics Refill Protocol  Passed - 3/23/2021 12:11 PM        Passed - Visit with PCP or prescribing provider visit in past 12 months     Last office visit with prescriber/PCP: 1/27/2020 Carlota Maradiaga MD OR same dept: Visit date not found OR same specialty: 2/14/2019 Tamia Sher FNP  Last physical: Visit date not found Last MTM visit: Visit date not found   Next visit within 3 mo: Visit date not found  Next physical within 3 mo: Visit date not found  Prescriber OR PCP: Carlota Maradiaga MD  Last diagnosis associated with med order: 1. Essential hypertension  - hydroCHLOROthiazide (HYDRODIURIL) 25 MG tablet; Take 1 tablet (25 mg total) by mouth daily.  Dispense: 90 tablet; Refill: 2    If protocol passes may refill for 12 months if within 3 months of last provider visit (or a total of 15 months).             Passed - Serum Potassium in past 12 months      Lab Results   Component Value Date    Potassium 3.3 (L) 08/25/2020             Passed - Serum Sodium in past 12 months      Lab Results   Component Value Date    Sodium 139 08/25/2020             Passed - Blood pressure on file in past 12 months     BP Readings from Last 1 Encounters:   01/19/21 118/62             Passed - Serum Creatinine in past 12 months      Creatinine   Date Value Ref Range Status   08/25/2020 1.25 (H) 0.60 - 1.10 mg/dL Final

## 2021-06-17 NOTE — PATIENT INSTRUCTIONS - HE
Patient Instructions by Kelli Gonsales CNP at 6/18/2019  6:00 PM     Author: Kelli Gonsales CNP Service: -- Author Type: Nurse Practitioner    Filed: 6/18/2019  7:07 PM Encounter Date: 6/18/2019 Status: Signed    : Kelli Gonsales CNP (Nurse Practitioner)         Patient Education     Sinusitis (Antibiotic Treatment)    The sinuses are air-filled spaces within the bones of the face. They connect to the inside of the nose. Sinusitis is an inflammation of the tissue that lines the sinuses. Sinusitis can occur during a cold. It can also happen due to allergies to pollens and other particles in the air. Sinusitis can cause symptoms of sinus congestion and a feeling of fullness. A sinus infection causes fever, headache, and facial pain. There is often green or yellow fluid draining from the nose or into the back of the throat (post-nasal drip). You have been given antibiotics to treat this condition.  Home care    Take the full course of antibiotics as instructed. Do not stop taking them, even when you feel better.    Drink plenty of water, hot tea, and other liquids. This may help thin nasal mucus. It also may help your sinuses drain fluids.    Heat may help soothe painful areas of your face. Use a towel soaked in hot water. Or,  the shower and direct the warm spray onto your face. Using a vaporizer along with a menthol rub at night may also help soothe symptoms.     An expectorant with guaifenesin may help thin nasal mucus and help your sinuses drain fluids.    You can use an over-the-counter decongestant, unless a similar medicine was prescribed to you. Nasal sprays work the fastest. Use one that contains phenylephrine or oxymetazoline. First blow your nose gently. Then use the spray. Do not use these medicines more often than directed on the label. If you do, your symptoms may get worse. You may also take pills that contain pseudoephedrine. Dont use products that combine  multiple medicines. This is because side effects may be increased. Read labels. You can also ask the pharmacist for help. (People with high blood pressure should not use decongestants. They can raise blood pressure.)    Over-the-counter antihistamines may help if allergies contributed to your sinusitis.      Do not use nasal rinses or irrigation during an acute sinus infection, unless your healthcare provider tells you to. Rinsing may spread the infection to other areas in your sinuses.    Use acetaminophen or ibuprofen to control pain, unless another pain medicine was prescribed to you. If you have chronic liver or kidney disease or ever had a stomach ulcer, talk with your healthcare provider before using these medicines. (Aspirin should never be taken by anyone under age 18 who is ill with a fever. It may cause severe liver damage.)    Don't smoke. This can make symptoms worse.  Follow-up care  Follow up with your healthcare provider or our staff if you are better in 1 week.  When to seek medical advice  Call your healthcare provider if any of these occur:    Facial pain or headache that gets worse    Stiff neck    Unusual drowsiness or confusion    Swelling of your forehead or eyelids    Vision problems, such as blurred or double vision    Fever of 100.4 F (38 C) or higher, or as directed by your healthcare provider    Seizure    Breathing problems    Symptoms don't go away in 10 days  Prevention  Here are steps you can take to help prevent an infection:    Keep good hand washing habits.    Dont have close contact with people who have sore throats, colds, or other upper respiratory infections.    Dont smoke, and stay away from secondhand smoke.    Stay up to date with of your vaccines.  Date Last Reviewed: 11/1/2017 2000-2017 [employer]. [address] [place] [address], [place] [address]. All rights reserved. This information is not intended as a substitute for professional medical care. Always follow  your healthcare professional's instructions.

## 2021-06-18 NOTE — PROGRESS NOTES
Internal Medicine Office Visit  [facility]  Patient Name: Mariela Jane  Patient Age: 67 y.o.  YOB: 1950  MRN: [id number]    Date of Visit: 2018  Reason for Office Visit:   Chief Complaint   Patient presents with     Sinusitis           Assessment / Plan / Medical Decision Makin. Rhinosinusitis  2. Bronchitis  - albuterol (PROAIR HFA;PROVENTIL HFA;VENTOLIN HFA) 90 mcg/actuation inhaler; Inhale 2 puffs every 6 (six) hours as needed for wheezing.  Dispense: 1 each; Refill: 0  - amoxicillin-clavulanate (AUGMENTIN) 875-125 mg per tablet; Take 1 tablet by mouth 2 (two) times a day for 7 days.  Dispense: 14 tablet; Refill: 0  - Supportive cares reviewed  - Follow up if worsening or no improvement/resolution in 7-10 days       Health Maintenance Review  Health Maintenance   Topic Date Due     FALL RISK ASSESSMENT  2017     MAMMOGRAM  01/10/2019     DXA SCAN  01/10/2019     ADVANCE DIRECTIVES DISCUSSED WITH PATIENT  2021     COLONOSCOPY  2025     TD 18+ HE  2027     PNEUMOCOCCAL POLYSACCHARIDE VACCINE AGE 65 AND OVER  Completed     INFLUENZA VACCINE RULE BASED  Completed     PNEUMOCOCCAL CONJUGATE VACCINE FOR ADULTS (PCV13 OR PREVNAR)  Addressed     ZOSTER VACCINE  Addressed         I am having Ms. Jane start on albuterol and amoxicillin-clavulanate. I am also having her maintain her calcium-vitamin D, fexofenadine, pseudoephedrine, ranitidine, ferrous sulfate, UNABLE TO FIND, (BOSWELLIA SUGAR EXTRACT (BOSWELLIA SUGAR XT, BULK, MISC)), UNABLE TO FIND, UNABLE TO FIND, hydroCHLOROthiazide, and atorvastatin. We will continue to administer (lidocaine 1%-EPINEPHrine 1:100,000 112 mL in sodium chloride 0.9% 1,000 mL (TUMESCENT)).      HPI:  Mariela Jane is a 67 y.o. year old who presents to the office today for sinus symptoms which started this past  after she was at her sister's house with a cat. Since that time she has had  swelling in the left side of the face, puffiness under the eye and pressure in the maxillary sinus. Left ear is sore and she has a achey feeling. She has pain in the upper gums on the left side. She tried taking benadryl on Monday and Tuesday and felt slightly better when she woke up in the morning. She also took ibuprofen which helped with the pain. She has some rhinorrhea which is clear in color. Has a cough which is sometimes productive.     She recently moved into her own apartment at [facility] in [place]. She enjoys this. Lives with her poodle.     Review of Systems- pertinent positive in bold:  A 10-point ROS is negative except as stated in HPI         Current Scheduled Meds:  Outpatient Encounter Prescriptions as of 5/24/2018   Medication Sig Dispense Refill     atorvastatin (LIPITOR) 40 MG tablet Take 0.5 tablets (20 mg total) by mouth daily. 90 tablet 3     BOSWELLIA SUGAR EXTRACT (BOSWELLIA SUGAR XT, BULK, MISC) Use As Directed.       calcium-vitamin D (CALCIUM-VITAMIN D) 500 mg(1,250mg) -200 unit per tablet Take 1 tablet by mouth 2 (two) times a day.        ferrous sulfate 325 (65 FE) MG tablet Take 1 tablet by mouth 2 (two) times a day with meals.  30 tablet 3     fexofenadine (ALLEGRA) 180 MG tablet Take 180 mg by mouth daily.       hydroCHLOROthiazide (HYDRODIURIL) 25 MG tablet Take 1 tablet (25 mg total) by mouth daily. 90 tablet 3     pseudoephedrine (SUDAFED) 30 MG tablet Take 30 mg by mouth every 4 (four) hours as needed for congestion (AS NEEDED FOR SINUS CONGESTION OR SINUS HEADACHE).       ranitidine (ZANTAC) 150 MG tablet Take 150 mg by mouth 2 (two) times a day.        UNABLE TO FIND Herbal medications for venous insufficiency and arithritis       UNABLE TO FIND Med Name:Zeynep       UNABLE TO FIND Med Name: Veriditas       albuterol (PROAIR HFA;PROVENTIL HFA;VENTOLIN HFA) 90 mcg/actuation inhaler Inhale 2 puffs every 6 (six) hours as needed for wheezing. 1 each 0      amoxicillin-clavulanate (AUGMENTIN) 875-125 mg per tablet Take 1 tablet by mouth 2 (two) times a day for 7 days. 14 tablet 0     Facility-Administered Encounter Medications as of 5/24/2018   Medication Dose Route Frequency Provider Last Rate Last Dose     lidocaine 1%-EPINEPHrine 1:100,000 112 mL in sodium chloride 0.9% 1,000 mL (TUMESCENT)  1,000 mL Irrigation Q1H PRN Roman Nguyen MD         Past Medical History:   Diagnosis Date     Anemia     While at [facility]     CNS lymphoma 2014     GERD (gastroesophageal reflux disease)      Multiple rib fractures March 2014     Pelvic fracture march 2014     Primary CNS lymphoma      Sinusitis      Past Surgical History:   Procedure Laterality Date     CATARACT EXTRACTION, BILATERAL Bilateral 2013     HYSTERECTOMY       OOPHORECTOMY      1 removed, 1 remains     WA ARTHROPLASTY TIBIAL PLATEAU      Description: Knee Replacement;  Recorded: 04/11/2012;     WA REMOVAL OF OVARY(S)      Description: Oophorectomy;  Recorded: 12/08/2011;  Comments: one intact-one with fibroma     WA REVISE MEDIAN N/CARPAL TUNNEL SURG      Description: Neuroplasty Decompression Median Nerve At Carpal Tunnel;  Recorded: 01/26/2009;     WA VAGINAL HYSTERECTOMY,UTERUS 250 GMS/<      Description: Vaginal Hysterectomy;  Recorded: 12/08/2011;     TOTAL KNEE ARTHROPLASTY Left 2002     Social History   Substance Use Topics     Smoking status: Former Smoker     Packs/day: 1.00     Years: 25.00     Types: Cigarettes     Quit date: 2/21/1985     Smokeless tobacco: Never Used     Alcohol use No      Comment: occasional       Objective / Physical Examination:  Vitals:    05/24/18 1353   BP: 120/66   Pulse: 76   Temp: 98.5  F (36.9  C)   TempSrc: Oral   Weight: 191 lb (86.6 kg)     Wt Readings from Last 3 Encounters:   05/24/18 191 lb (86.6 kg)   02/08/18 193 lb 4.8 oz (87.7 kg)   02/06/18 193 lb (87.5 kg)     Body mass index is 30.37 kg/(m^2).     General Appearance: Alert and oriented, cooperative, affect  appropriate, speech clear, in no apparent distress  Head: Normocephalic, atraumatic. Left suborbital and maxillary area tenderness and mild swelling. She has exquisite left maxillary tenderness to palpation.   Ears: Tympanic membrane clear with landmarks well visualized bilaterally  Eyes: PERRL, Conjunctivae clear and sclerae non-icteric  Neck: Supple, trachea midline. Mild left cervical adenopathy  Lungs: Inspiratory wheezing in upper lobes bilaterally. Normal inspiratory and expiratory effort. No crackles  Cardiovascular: Regular rate, normal S1, S2. No murmurs, rubs, or gallops        No orders of the defined types were placed in this encounter.  Followup: Return if symptoms worsen or fail to improve. earlier if needed.        Tamia Sher, CNP

## 2021-06-18 NOTE — LETTER
Letter by Tamia Sher FNP at      Author: Tamia Sher FNP Service: -- Author Type: --    Filed:  Encounter Date: 2/15/2019 Status: (Other)       Mariela Jane  [address]  [place] [address]             February 15, 2019         Dear Mariela Jane,    Below are the results from Mariela's recent visit:    Resulted Orders   XR Chest 2 Views    Narrative    EXAM DATE:         02/14/2019    [facility]  X-RAY CHEST, 2 VIEWS, FRONTAL AND LATERAL  2/14/2019 11:30 AM    INDICATION: Cough  COMPARISON: 12/05/2018    FINDINGS: Multiple old right rib fractures. Moderate hiatal hernia. Heart size  appears normal. Lungs appear clear.                 Your chest x-ray is normal.    Please call with questions or contact us using [employer].    Sincerely,        Electronically signed by MAYI Portillo

## 2021-06-18 NOTE — LETTER
Letter by Tamia Sher FNP at      Author: Tamia Sher FNP Service: -- Author Type: --    Filed:  Encounter Date: 2/15/2019 Status: (Other)       Mariela Jane  [address]  [place] [address]             February 15, 2019         Dear Mariela Jane,    Below are the results from Mariela's recent visit:    Resulted Orders   BNP(B-type Natriuretic Peptide)   Result Value Ref Range    BNP 44 0 - 120 pg/mL   Lipid Profile   Result Value Ref Range    Triglycerides 113 <=149 mg/dL    Cholesterol 184 <=199 mg/dL    LDL Calculated 98 <=129 mg/dL    HDL Cholesterol 63 >=50 mg/dL    Patient Fasting > 8hrs? Unknown         Your lab tests show cholesterol levels which are stable compared to last year and very good. Since your  chest xray is normal and the heart lab test is normal, I am going to call in omeprazole 20 mg twice daily  to your pharmacy. You should stop taking ranitidine while taking this. I would like to see you back in 1  month to reassess the cough after this.     Please call with questions or contact us using [employer].    Sincerely,        Electronically signed by MAYI Portillo

## 2021-06-19 NOTE — PROGRESS NOTES
Internal Medicine Office Visit  [facility]  Patient Name: Mariela Jane  Patient Age: 68 y.o.  YOB: 1950  MRN: [id number]    Date of Visit: 2018  Reason for Office Visit:   Chief Complaint   Patient presents with     Follow-up           Assessment / Plan / Medical Decision Makin. Essential hypertension  - stable     2. HLD (hyperlipidemia)  - tolerates statin  - repeat labs in 6 months    3. OAB (overactive bladder)  - Stable, offered pelvic floor therapy    4. Primary CNS lymphoma (H)  - followed by hem/onc and stable    5. Venous insufficiency  - patient has chosen to defer venous ablation at this time and is doing chiropractic treatments and compression stockings only       - Shingrix vaccine recommended, she will check on cost. She did not think she had ever had chicken pox but a varicella test shows immunity upon review of her records       Health Maintenance Review  Health Maintenance   Topic Date Due     FALL RISK ASSESSMENT  2017     INFLUENZA VACCINE RULE BASED (1) 2018     MAMMOGRAM  01/10/2019     DXA SCAN  01/10/2019     ADVANCE DIRECTIVES DISCUSSED WITH PATIENT  2021     COLONOSCOPY  2025     TD 18+ HE  2027     PNEUMOCOCCAL POLYSACCHARIDE VACCINE AGE 65 AND OVER  Completed     PNEUMOCOCCAL CONJUGATE VACCINE FOR ADULTS (PCV13 OR PREVNAR)  Addressed     ZOSTER VACCINE  Addressed         I am having Ms. Jane maintain her calcium-vitamin D, fexofenadine, pseudoephedrine, ranitidine, ferrous sulfate, UNABLE TO FIND, (BOSWELLIA SUGAR EXTRACT (BOSWELLIA SUGAR XT, BULK, MISC)), UNABLE TO FIND, UNABLE TO FIND, hydroCHLOROthiazide, atorvastatin, and albuterol.      HPI:  Mariela Jane is a 68 y.o. year old who presents to the office today for for 6 month follow up of chronic medical conditions.      She has a history of venous insufficiency. She has burning in the feet and ankles.  She was supposed to get a phone  call regarding a venous ablation but has not yet heard from the vascular center.  She is concerned about the discoloration in her left lower extremity in particular. She has been going to a chiropractor and has been amazed how this seems to be helping with the venous discoloration. She has decided to defer the venous ablation procedure at this time particularly since she would likely need to repeat the venous ultrasound and this would be too painful for her .      GERD- takes ranitidine twice daily. Does not notice any heart burn symptoms.       There is a history of primary CNS lymphoma.  She is now followed by heme/onc and is doing well, remains in remission. Se was seen yesterday and had an MRI. Also has opthalmology order for follow up due to a vision change where she sees a wavy line in the center of her vision. Her sister has a history of macular degeneration.      OAB- slightly better with going to the chiropractor but not dramatically better.     Review of Systems- pertinent positive in bold:  A 10-point ROS is negative except as stated in HPI       Current Scheduled Meds:  Outpatient Encounter Prescriptions as of 8/8/2018   Medication Sig Dispense Refill     albuterol (PROAIR HFA;PROVENTIL HFA;VENTOLIN HFA) 90 mcg/actuation inhaler Inhale 2 puffs every 6 (six) hours as needed for wheezing. 1 each 0     atorvastatin (LIPITOR) 40 MG tablet Take 0.5 tablets (20 mg total) by mouth daily. 90 tablet 3     BOSWELLIA SUGAR EXTRACT (BOSWELLIA SUGAR XT, BULK, MISC) Use As Directed.       calcium-vitamin D (CALCIUM-VITAMIN D) 500 mg(1,250mg) -200 unit per tablet Take 1 tablet by mouth 2 (two) times a day.        ferrous sulfate 325 (65 FE) MG tablet Take 1 tablet by mouth 2 (two) times a day with meals.  30 tablet 3     fexofenadine (ALLEGRA) 180 MG tablet Take 180 mg by mouth daily.       hydroCHLOROthiazide (HYDRODIURIL) 25 MG tablet Take 1 tablet (25 mg total) by mouth daily. 90 tablet 3     pseudoephedrine  (SUDAFED) 30 MG tablet Take 30 mg by mouth every 4 (four) hours as needed for congestion (AS NEEDED FOR SINUS CONGESTION OR SINUS HEADACHE).       ranitidine (ZANTAC) 150 MG tablet Take 150 mg by mouth 2 (two) times a day.        UNABLE TO FIND Herbal medications for venous insufficiency and arithritis       UNABLE TO FIND Med Name:Lettyedin       UNABLE TO FIND Med Name: Jo Ann       No facility-administered encounter medications on file as of 8/8/2018.      Past Medical History:   Diagnosis Date     Anemia     While at [place]     CNS lymphoma (H) 2014     GERD (gastroesophageal reflux disease)      Multiple rib fractures March 2014     Pelvic fracture (H) march 2014     Primary CNS lymphoma (H)      Sinusitis      Past Surgical History:   Procedure Laterality Date     CATARACT EXTRACTION, BILATERAL Bilateral 2013     HYSTERECTOMY       OOPHORECTOMY      1 removed, 1 remains     IL ARTHROPLASTY TIBIAL PLATEAU      Description: Knee Replacement;  Recorded: 04/11/2012;     [place] REMOVAL OF OVARY(S)      Description: Oophorectomy;  Recorded: 12/08/2011;  Comments: one intact-one with fibroma     IL REVISE MEDIAN N/CARPAL TUNNEL SURG      Description: Neuroplasty Decompression Median Nerve At Carpal Tunnel;  Recorded: 01/26/2009;     [place] VAGINAL HYSTERECTOMY,UTERUS 250 GMS/<      Description: Vaginal Hysterectomy;  Recorded: 12/08/2011;     TOTAL KNEE ARTHROPLASTY Left 2002     Social History   Substance Use Topics     Smoking status: Former Smoker     Packs/day: 1.00     Years: 25.00     Types: Cigarettes     Quit date: 2/21/1985     Smokeless tobacco: Never Used     Alcohol use No      Comment: occasional       Objective / Physical Examination:  Vitals:    08/08/18 1314   BP: 124/64   Pulse: 70   Weight: 194 lb (88 kg)     Wt Readings from Last 3 Encounters:   08/08/18 194 lb (88 kg)   08/07/18 195 lb 8 oz (88.7 kg)   08/02/18 191 lb (86.6 kg)     Body mass index is 30.84 kg/(m^2).     General Appearance: Alert and  oriented, cooperative, affect appropriate, speech clear, in no apparent distress  Neck: Supple, trachea midline. No carotid bruits  Lungs: Clear to auscultation bilaterally. Normal inspiratory and expiratory effort  Cardiovascular: Regular rate, normal S1, S2. No murmurs, rubs, or gallops  Extremities: Pulses 2+ and equal throughout. Trace edema. Venous insufficiency skin changes     No orders of the defined types were placed in this encounter.  Followup: Return in about 6 months (around 2/8/2019) for Next scheduled follow up. earlier if needed.        Tamia Sher, CNP

## 2021-06-19 NOTE — PROGRESS NOTES
Patient here today for labs and 6 month follow-up with Dr. Corbin for primary CNS lymphoma/TIFFANY Chatman RN.

## 2021-06-19 NOTE — PROGRESS NOTES
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    ECOG Performance   ECOG Performance Status: 1     Distress Assessment  Distress Assessment Score: No distress    Pain  Currently in Pain: Yes  Pain Score (Initial OR Reassessment): 4  Location: bilat shoulders    A 68 y.o. 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       #.  Primary CNS lymphoma status post chemotherapy with MRT followed by auto stem cell transplant on 9/3/14 at [facility]   - Reviewed her last MRI brain last week showed no evidence of recurrent lymphoma and she was reassured.  According to the NCCN guidelines, I will follow her with exam and MRI brain with contrast every 6 months until September 2019, then annually for at least 5 years.   - RTC 6 months with MRI brain prior.   - She is advised to call us if there is any new concerns or symptoms.    #. Right eye vision impairment for a few weeks   No injury. Family h/o retinal detachment. Referral to ophthalmology.    #. Urinary incontinence, chronic and progressive   She is going to see urology. Previously evaluated at [facility] at the time she had treatment for PCNSL.     #. H/o Iron deficiency anemia   Hemogram is normal and no concerns for anemia or iron deficiency.    - She had an EGD, colonoscopy and capsule endoscopy in March 2016 at [facility] and all were unremarkable.  She also had a PET scan on the same day and it did not identify any active lymphoma.  She has a mild asymmetric fullness in the right base with SUV 5.6 likely to be inflammatory and low FDG activity right level II cervical lymph nodes with SUV 3.5  "likely to be reactive.   - She is taking iron supplementation ( ferrous sulfate 325 mg 3 times a day). She was advised to continue on iron supplementation lifelong per her hematologist at [facility].     #.  History of pulmonary embolism, diagnosed in June 2014.     - treated with anticoagulation.   - She is fully aware of signs and symptoms of venous thromboembolism and time to seek medical attention.     TT:  40 minutes and more than 50% was spent on coordination and counseling of care.    Problem List    1. Primary CNS lymphoma (H)  MR Brain With Without Contrast    Ambulatory referral to Ophthalmology      ______________________________________________________________________________  Cancer history  March 2014: 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  Presented with 2 months history of headache and blurred and double vision.  April 2014 to June 2014-completed 3 cycles of induction MRT (methotrexate was discontinued after 3 cycles due to prolonged profound cytopenia)  9/3/2014- underwent autologous stem cell transplantation with conditioning chemotherapy with BCNU and thiotepa    History of Present Illness    Mariela is doing very well.    She reported right eye central vision impairment/seeing a wavy/wrinkle surface for a few weeks, not getting worse, stable. No new headaches.    Pain Status  Currently in Pain: Yes    Review of Systems    Constitutional  Constitutional (WDL): Exceptions to WDL  Fatigue: None  Fever: None  Chills: None  Weight Gain: 5 - <10% from baseline (4# 5/24/18)  Weight Loss: None  Neurosensory  Neurosensory (WDL): Exceptions to WDL  Peripheral Motor Neuropathy: None  Ataxia: None  Peripheral Sensory Neuropathy: Asymptomatic, loss of deep tendon reflexes or paresthesia ( bilat hands due to carpal tunnel)  Confusion: None  Syncope: None  Eye   Eye Disorder (WDL): Exceptions to WDL (itchy, \"dead spot\" in R eye.  Feels like wrinkle in R eye)  Blurred " "Vision: Intervention not indicated (R eye.)  Dry Eye: None  Eye Pain: None  Watering Eyes: None  Ear  Ear Disorder (WDL): Exceptions to WDL  Ear Pain: None  Tinnitus: Mild symptoms, intervention not indicated (chronic)  Cardiovascular  Cardiovascular (WDL): Exceptions to WDL  Palpitations: None  Edema: Yes  Edema Limbs: 5 - 10% inter-limb discrepancy in volume or circumference at point of greatest visible difference, swelling or obscuration of anatomic architecture on close inspection (Bilat LE)  Phlebitis: None  Superficial thrombophlebitis: None  Pulmonary  Respiratory (WDL): Exceptions to WDL  Cough: Mild symptoms, nonprescription intervention indicated (occ, productive clear, thick phlegm, \"rubbery\")  Dyspnea: None  Hypoxia: None  Gastrointestinal  Gastrointestinal (WDL): All gastrointestinal elements are within defined limits  Genitourinary  Genitourinary (WDL): Exceptions to WDL (incon urine-since accident/wears Depends)  Urinary Frequency: None  Urinary Retention: None  Urinary Tract Pain: None  Lymphatic  Lymph (WDL): All lymph disorder elements are within defined limits  Musculoskeletal and Connective Tissue  Musculoskeletal and Connetive Tissue Disorders (WDL): Exceptions to WDL  Arthralgia: Mild pain (shoulder)  Bone Pain: None  Muscle Weakness : None  Myalgia: None  Integumentary  Integumentary (WDL): All integumentary elements are within defined limits  Patient Coping  Patient Coping: Accepting  Distress Assessment  Distress Assessment Score: No distress  Accompanied by  Accompanied by: Alone  Oral Chemo Adherence       Past History  Past Medical History:   Diagnosis Date     Anemia     While at [place]     CNS lymphoma (H) 2014     GERD (gastroesophageal reflux disease)      Multiple rib fractures March 2014     Pelvic fracture (H) march 2014     Primary CNS lymphoma (H)      Sinusitis        Physical Exam    Recent Vitals 8/8/2018   Height -   Weight 194 lbs   BSA (m2) -   /64   Pulse 70   Temp - "   Temp src -   SpO2 -   Some recent data might be hidden     General: alert, awake, not in acute distress  HEENT: Head: Normal, normocephalic, atraumatic.  Eye: Normal external eye, conjunctiva, lids cornea, DHRUV.  Ears:  Non-tender.  Nose: Normal external nose, mucus membranes and septum.  Pharynx: Dental Hygiene adequate. Normal buccal mucosa. Normal pharynx.  Neck / Thyroid: Supple, no masses, nodes, nodules or enlargement.  Lymphatics: No abnormally enlarged lymph nodes.  Chest: Normal chest wall and respirations. Clear to auscultation.  Heart: S1 S2 RRR, no murmur.   Abdomen: abdomen is soft without significant tenderness, masses, organomegaly or guarding  Extremities: normal strength, tone, and muscle mass  Skin: Left foot from toes to the midfoot with deep purplish color.  No open wounds.  Palpable pulse.  Right foot has slight erythema will but not to a severe degree as left foot.  She is wearing compressive stockings.  CNS: non focal.        Lab Results    Recent Results (from the past 168 hour(s))   Comprehensive Metabolic Panel   Result Value Ref Range    Sodium 141 136 - 145 mmol/L    Potassium 3.7 3.5 - 5.0 mmol/L    Chloride 101 98 - 107 mmol/L    CO2 32 (H) 22 - 31 mmol/L    Anion Gap, Calculation 8 5 - 18 mmol/L    Glucose 110 70 - 125 mg/dL    BUN 22 8 - 28 mg/dL    Creatinine 1.24 (H) 0.60 - 1.10 mg/dL    GFR MDRD Af Amer 52 (L) >60 mL/min/1.73m2    GFR MDRD Non Af Amer 43 (L) >60 mL/min/1.73m2    Bilirubin, Total 0.6 0.0 - 1.0 mg/dL    Calcium 10.3 8.5 - 10.5 mg/dL    Protein, Total 7.3 6.0 - 8.0 g/dL    Albumin 3.6 3.5 - 5.0 g/dL    Alkaline Phosphatase 96 45 - 120 U/L    AST 20 0 - 40 U/L    ALT 15 0 - 45 U/L   HM1 (CBC with Diff)   Result Value Ref Range    WBC 7.0 4.0 - 11.0 thou/uL    RBC 4.02 3.80 - 5.40 mill/uL    Hemoglobin 12.5 12.0 - 16.0 g/dL    Hematocrit 38.1 35.0 - 47.0 %    MCV 95 80 - 100 fL    MCH 31.1 27.0 - 34.0 pg    MCHC 32.8 32.0 - 36.0 g/dL    RDW 13.3 11.0 - 14.5 %     Platelets 290 140 - 440 thou/uL    MPV 8.8 8.5 - 12.5 fL    Neutrophils % 71 (H) 50 - 70 %    Lymphocytes % 20 20 - 40 %    Monocytes % 6 2 - 10 %    Eosinophils % 3 0 - 6 %    Basophils % 0 0 - 2 %    Neutrophils Absolute 5.0 2.0 - 7.7 thou/uL    Lymphocytes Absolute 1.4 0.8 - 4.4 thou/uL    Monocytes Absolute 0.4 0.0 - 0.9 thou/uL    Eosinophils Absolute 0.2 0.0 - 0.4 thou/uL    Basophils Absolute 0.0 0.0 - 0.2 thou/uL       Imaging    Mr Brain With Without Contrast    Result Date: 8/2/2018  HEAD MRI WO/W IV CONTRAST 8/2/2018 1:15 PM INDICATION: Primary CNS lymphoma, post treatment follow up. TECHNIQUE: Head MRI without and with intravenous contrast. CONTRAST: Gadavist 8.5 mL. COMPARISON: 2/1/2018. 2/14/2017. 3/4/2014. FINDINGS: A right frontal freedom hole defect is again demonstrated with underlying susceptibility signal, likely related to prior biopsy. Patchy and confluent T2 hyperintense white matter signal abnormality is again demonstrated throughout the supratentorial white matter. It is essentially unchanged in extent and configuration when compared with the study of 2/1/2018. No mass effect or midline shift is demonstrated. No abnormal parenchymal, pachymeningeal or leptomeningeal enhancement is demonstrated. No intracranial hemorrhage or extraaxial collection is demonstrated. There is no evidence of diffusion restriction. There is moderate generalized atrophy. There is superimposed mild to moderate ventriculomegaly, similar to prior. The major intracranial flow voids are unremarkable. The pituitary gland is unremarkable for technique. The cerebellar tonsils are appropriately positioned. The paranasal sinuses and mastoid air cells are unremarkable in signal. There has been bilateral cataract surgery. The calvarium and extracranial soft tissues are unremarkable.     CONCLUSION: 1.  No findings specific for disease recurrence. 2.  Stable appearance of confluent T2 hyperintense white matter signal abnormality,  likely representing a combination of treatment-related change and chronic microangiopathic change. 3.  Stable postbiopsy changes in the right frontal lobe. 4.  Mild to moderate ventriculomegaly, similar to 2/1/2018.        Signed by: Radha Corbin MD

## 2021-06-20 ENCOUNTER — HEALTH MAINTENANCE LETTER (OUTPATIENT)
Age: 71
End: 2021-06-20

## 2021-06-20 NOTE — LETTER
Letter by Radha Corbin MD at      Author: Radha Corbin MD Service: -- Author Type: --    Filed:  Encounter Date: 6/22/2020 Status: (Other)                   Office Hours: Monday - Friday 8:00 - 4:30PM    Mariela Jane  [address]  [facility] [address]           June 22, 2020      Dear Mariela:    It looks as though you are due to see Dr. Corbin in August. If you would like to schedule this please call [phone]         Sincerely,        [facility]

## 2021-06-20 NOTE — PROGRESS NOTES
Received prior-authorization approval from [facility] for approval valid 8/8/18-12/31/18 for 36475 x 1 and 36476 x 1.

## 2021-06-20 NOTE — PROGRESS NOTES
"Assessment/Plan:      Visit for Preoperative Exam.     1. Preoperative examination  2. Macular hole of right eye  - Patient is approved for surgery  - May take medications with a sip of water on the morning of surgery     3. Chronic kidney disease, stage II (mild)  - Stable     4. COPD (chronic obstructive pulmonary disease) (H)  - Stable     5. Esophageal reflux  - Continue ranitidine    6. HLD (hyperlipidemia)  - Continue statin     7. Essential hypertension  - Stable     8. Primary CNS lymphoma (H)  - In remission, followed regularly for this     9. Venous insufficiency  - Doing well with chiropractor treatments and has decided to defer surgical intervention for venous insufficiency         Subjective:     Scheduled Procedure: right eye macular hole  Surgery Date:  9/25/2018  [facility] Location[facility]  [facility]  Surgeon:  Dr Sliva    HPI: Mariela Jane is a 69 y/o female comes to the office today for preoperative physical.  She was found to have a right eye macular hole and will undergo surgery for this.  It appears that there is a \"crimp\" in the central vision and is looking forward to the procedure.    Acid reflux is well controlled with ranitidine 150 mg twice daily.    We we have reviewed her history of hypertension.  Her blood pressure is well controlled, she is not expressing any lightheadedness or dizziness associated with the medication.    She continues to take atorvastatin 40 mg daily for hyperlipidemia.  She is tolerating this well.    She has an albuterol inhaler and uses this infrequently.      Current Outpatient Prescriptions   Medication Sig Dispense Refill     albuterol (PROAIR HFA;PROVENTIL HFA;VENTOLIN HFA) 90 mcg/actuation inhaler Inhale 2 puffs every 6 (six) hours as needed for wheezing. 1 each 0     atorvastatin (LIPITOR) 40 MG tablet Take 0.5 tablets (20 mg total) by mouth daily. 90 tablet 3     BOSWELLIA SUGAR EXTRACT (BOSWELLIA SUGAR XT, BULK, MISC) Use As Directed.       " calcium-vitamin D (CALCIUM-VITAMIN D) 500 mg(1,250mg) -200 unit per tablet Take 1 tablet by mouth 2 (two) times a day.        ferrous sulfate 325 (65 FE) MG tablet Take 1 tablet by mouth 2 (two) times a day with meals.  30 tablet 3     fexofenadine (ALLEGRA) 180 MG tablet Take 180 mg by mouth daily.       hydroCHLOROthiazide (HYDRODIURIL) 25 MG tablet Take 1 tablet (25 mg total) by mouth daily. 90 tablet 3     pseudoephedrine (SUDAFED) 30 MG tablet Take 30 mg by mouth every 4 (four) hours as needed for congestion (AS NEEDED FOR SINUS CONGESTION OR SINUS HEADACHE).       ranitidine (ZANTAC) 150 MG tablet Take 150 mg by mouth 2 (two) times a day.        UNABLE TO FIND Herbal medications for venous insufficiency and arithritis       UNABLE TO FIND Med Name:Zeynep       UNABLE TO FIND Med Name: Jo Ann       Current Facility-Administered Medications   Medication Dose Route Frequency Provider Last Rate Last Dose     lidocaine 10 mg/mL (1 %) 1,120 mg, EPINEPHrine 1.12 mg, sodium bicarbonate 11.2 mEq in sodium chloride 0.9% 1,000 mL (TUMESCENT)  1,000 mL Irrigation Q1H PRN Roman Nguyen MD         lidocaine 10 mg/mL (1 %) 1,120 mg, EPINEPHrine 1.12 mg, sodium bicarbonate 11.2 mEq in sodium chloride 0.9% 1,000 mL (TUMESCENT)  1,000 mL Irrigation Q1H PRN Roman Nguyen MD           Allergies   Allergen Reactions     Losartan Cough     intolerance     Hydralazine Rash     Pentamidine Isethionate Rash       Immunization History   Administered Date(s) Administered     DT (pediatric) 01/01/2000     Hep A, Adult IM (19yr & older) 02/14/2017, 08/08/2017     Hep A, historic 04/28/2008, 06/09/2011     Hep B, Peds or Adolescent 02/07/2017     Hep B, historic 08/19/2015, 11/17/2015     HiB, historic,unspecified 08/19/2015, 11/17/2015     Hib (PRP-T) 02/14/2017     IPV 08/19/2015, 11/17/2015, 02/07/2017     Influenza high dose, seasonal 01/03/2017, 12/07/2017, 09/18/2018     Influenza, inj, historic,unspecified 10/01/2011,  10/01/2013, 10/17/2013, 11/17/2015     Meningococcal MCV4P 02/14/2017     Pneumo Conj 13-V (2010&after) 08/19/2015, 11/17/2015     Pneumo Polysac 23-V 01/17/2014     Td,adult,historic,unspecified 01/01/2000, 11/17/2015     Tdap 06/09/2011, 08/19/2015, 02/07/2017       Patient Active Problem List   Diagnosis     Blood loss anemia     Obesity     COPD (chronic obstructive pulmonary disease) (H)     Generalized Osteoarthritis Of The Hand     Esophageal Reflux     Allergy To Dogs     HLD (hyperlipidemia)     Primary CNS lymphoma (H)     History of pulmonary embolus (PE)- 6/2014. tx with lovenox. No recurrence      HTN (hypertension)     Stem cells transplant status (H)     Chronic kidney disease, stage II (mild)     Venous insufficiency     Post-traumatic osteoarthritis of hip     OAB (overactive bladder)     Low back pain       Past Medical History:   Diagnosis Date     Anemia     While at [place]     CNS lymphoma (H) 2014     GERD (gastroesophageal reflux disease)      Multiple rib fractures March 2014     Pelvic fracture (H) march 2014     Primary CNS lymphoma (H)      Sinusitis        Social History     Social History     Marital status:      Spouse name: N/A     Number of children: N/A     Years of education: N/A     Occupational History     Not on file.     Social History Main Topics     Smoking status: Former Smoker     Packs/day: 1.00     Years: 25.00     Types: Cigarettes     Quit date: 2/21/1985     Smokeless tobacco: Never Used     Alcohol use No      Comment: occasional     Drug use: No     Sexual activity: No     Other Topics Concern     Not on file     Social History Narrative       Past Surgical History:   Procedure Laterality Date     CATARACT EXTRACTION, BILATERAL Bilateral 2013     HYSTERECTOMY       OOPHORECTOMY      1 removed, 1 remains     SD ARTHROPLASTY TIBIAL PLATEAU      Description: Knee Replacement;  Recorded: 04/11/2012;     SD REMOVAL OF OVARY(S)      Description: Oophorectomy;   Recorded: 12/08/2011;  Comments: one intact-one with fibroma     TX REVISE MEDIAN N/CARPAL TUNNEL SURG      Description: Neuroplasty Decompression Median Nerve At Carpal Tunnel;  Recorded: 01/26/2009;     TX VAGINAL HYSTERECTOMY,UTERUS 250 GMS/<      Description: Vaginal Hysterectomy;  Recorded: 12/08/2011;     TOTAL KNEE ARTHROPLASTY Left 2002           History of Present Illness  Recent Health  Fever: no  Chills: no  Fatigue: no  Chest Pain: no  Cough: no  Dyspnea: no  Urinary Frequency: no  Nausea: no  Vomiting: no  Diarrhea: no  Abdominal Pain: no  Easy Bruising: no  Lower Extremity Swelling: no  Poor Exercise Tolerance: no    Pertinent History  Prior Anesthesia: yes  Previous Anesthesia Reaction:  no  Diabetes: no  Cardiovascular Disease: no  Pulmonary Disease: no  Renal Disease: no  GI Disease: no  Sleep Apnea: no  Thromboembolic Problems: no  Clotting Disorder: no  Bleeding Disorder: no  Transfusion Reaction: no  Impaired Immunity: no  Steroid use in the last 6 months: no  Frequent Aspirin use: no    Family history: There is no family history of abnormal reaction to anesthesia or sudden unexplained death    After surgery, the patient plans to recover at home with family with her sister.    Review of Systems: Positives in bold  Constitutional: Fever, chills, night sweats, fainting, weight change, fatigue, seizures, dizziness, sleeping difficulties, loud snoring/pauses in breathing  Eyes: change in vision, blurred or double vision, redness/eye pain  Ears, nose, mouth, throat: change in hearing, ear pain, hoarseness, difficulty swallowing, sores in the mouth or throat  Respiratory: shortness of breath, cough, bloody sputum, wheezing  Cardiovascular: chest pain, palpitations   Gastrointestinal: abdominal pain, heartburn/indigestion, nausea/vomiting, change in appetite, change in bowel habits, constipation or diarrhea, rectal bleeding/dark stools, difficulty swallowing  Urinary: painful urination, frequent  "urination, urinary urgency/incontinence, blood in urine/dark urine, nocturia  Genital: WOMEN: vaginal discharge or odor, bleeding/pain with intercourse, pelvic pain, vulvar/vaginal itching or burning, excessive menstrual bleeding, problems with sexual function  Musculoskeletal: backache/back pain (new or increasing), weakness, joint pain/stiffness (new or increasing), muscle cramps, swelling of hands, feet, ankles, leg pain/redness  Skin: change in moles/freckles, rash, nodules  Hematologic/lymphatic: swollen lymph glands, abnormal bruising/bleeding  Endocrine: excessive thirst/urination, cold or heat intolerance  Breast: breast lump, breast pain, nipple discharge/skin changes  Neurologic/emotional: worrisome memory change, numbness/tingling, anxiety, mood swings      Objective:       Vitals:    09/18/18 1323   BP: 126/70   Pulse: 64   Weight: 193 lb (87.5 kg)   Height: 5' 6.5\" (1.689 m)         Physical Exam:  General Appearance: Alert, cooperative, no distress, appears stated age  Head: Normocephalic, without obvious abnormality, atraumatic  Eyes: PERRL, conjunctiva/corneas clear, EOM's intact  Ears: Normal TM's and external ear canals, both ears  Throat: Lips, mucosa, and tongue normal; teeth and gums normal  Neck: Supple, symmetrical, trachea midline, no adenopathy;  thyroid: not enlarged, symmetric, no tenderness/mass/nodules; no carotid bruit or JVD  Lungs: Clear to auscultation bilaterally, respirations unlabored  Heart: Regular rate and rhythm, S1 and S2 normal, no murmur, rub, or gallop  Abdomen: Soft, non-tender, bowel sounds active all four quadrants,  no masses, no organomegaly  Extremities: Extremities normal, atraumatic. Venous insufficiency skin changes bilateral lower extremities   Skin: Skin color, texture, turgor normal, no rashes or lesions  Lymph nodes: Cervical, supraclavicular, and axillary nodes normal  Neurologic: Normal        No results found for this or any previous visit (from the past " 240 hour(s)).          Tamia Sher, CNP  [facility]

## 2021-06-20 NOTE — LETTER
Letter by Carlota Maradiaga MD at      Author: Carlota Maradiaga MD Service: -- Author Type: --    Filed:  Encounter Date: 1/27/2020 Status: (Other)         Mariela Jane  [address]  [place] [address]             January 27, 2020         Dear Ms. Lucinda,    Below are the results from your recent visit:    Resulted Orders   Comprehensive Metabolic Panel   Result Value Ref Range    Sodium 141 136 - 145 mmol/L    Potassium 3.6 3.5 - 5.0 mmol/L    Chloride 101 98 - 107 mmol/L    CO2 32 (H) 22 - 31 mmol/L    Anion Gap, Calculation 8 5 - 18 mmol/L    Glucose 95 70 - 125 mg/dL    BUN 18 8 - 28 mg/dL    Creatinine 1.18 (H) 0.60 - 1.10 mg/dL    GFR MDRD Af Amer 55 (L) >60 mL/min/1.73m2    GFR MDRD Non Af Amer 45 (L) >60 mL/min/1.73m2    Bilirubin, Total 0.5 0.0 - 1.0 mg/dL    Calcium 9.7 8.5 - 10.5 mg/dL    Protein, Total 6.9 6.0 - 8.0 g/dL    Albumin 3.6 3.5 - 5.0 g/dL    Alkaline Phosphatase 100 45 - 120 U/L    AST 19 0 - 40 U/L    ALT 14 0 - 45 U/L    Narrative    Fasting Glucose reference range is 70-99 mg/dL per  American Diabetes Association (ADA) guidelines.   Lipid [place] FASTING   Result Value Ref Range    Cholesterol 175 <=199 mg/dL    Triglycerides 124 <=149 mg/dL    HDL Cholesterol 62 >=50 mg/dL    LDL Calculated 88 <=129 mg/dL    Patient Fasting > 8hrs? Yes    HM1 (CBC with Diff)   Result Value Ref Range    WBC 6.0 4.0 - 11.0 thou/uL    RBC 4.20 3.80 - 5.40 mill/uL    Hemoglobin 12.5 12.0 - 16.0 g/dL    Hematocrit 38.0 35.0 - 47.0 %    MCV 90 80 - 100 fL    MCH 29.8 27.0 - 34.0 pg    MCHC 32.9 32.0 - 36.0 g/dL    RDW 12.6 11.0 - 14.5 %    Platelets 370 140 - 440 thou/uL    MPV 6.9 (L) 7.0 - 10.0 fL    Neutrophils % 69 50 - 70 %    Lymphocytes % 22 20 - 40 %    Monocytes % 6 2 - 10 %    Eosinophils % 3 0 - 6 %    Basophils % 0 0 - 2 %    Neutrophils Absolute 4.1 2.0 - 7.7 thou/uL    Lymphocytes Absolute 1.3 0.8 - 4.4 thou/uL    Monocytes Absolute 0.4 0.0 - 0.9 thou/uL     Eosinophils Absolute 0.2 0.0 - 0.4 thou/uL    Basophils Absolute 0.0 0.0 - 0.2 thou/uL       Normal electrolytes, CBC, and liver functions. Renal function is improved, but slightly reduced. Lipids are excellent. Recommend continue your same medications and recheck in 4-6 mos.     Please call with questions or contact us using [employer].    Sincerely,    Electronically signed by Carlota Maradiaga MD

## 2021-06-20 NOTE — LETTER
Letter by Radha Corbin MD at      Author: Radha Corbin MD Service: -- Author Type: --    Filed:  Encounter Date: 7/10/2020 Status: (Other)                   Office Hours: Monday - Friday 8:00 - 4:30PM    Mariela Jane  [address]  [facility] [address]           July 10, 2020      Dear Mariela:    It looks as though you are due to see Dr. Corbin. If you would like to schedule this please call [phone]         Sincerely,        [facility]

## 2021-06-24 NOTE — PROGRESS NOTES
[facility] Daily Progress     Patient Name: Mariela Jane  Date: 3/8/2019  Visit #: 3/8  Authorization dates:  02/25/19 - 04/29/19  Referral Diagnosis: Acute pain of left shoulder  Referring provider: Tamia Sher FNP  Visit Diagnosis:     ICD-10-CM    1. Chronic left shoulder pain M25.512     G89.29    2. Generalized muscle weakness M62.81        No Data Recorded     Assessment:     Response to Intervention:  Tolerated therapeutic exercises well in session and demonstrated understanding    Symptoms are consistent with:  Medical diagnosis, chronic left shoulder pain  Patient is appropriate to continue with skilled physical therapy intervention, as indicated by initial plan of care.    Goal Status:  Pt. will demonstrate/verbalize independence in self-management of condition in : 6 weeks  Pt. will be independent with home exercise program in : 6 weeks  Pt. will report decreased intensity, frequency of : in 6 weeks;Pain  Pt. will have improved quality of sleep: with less pain;in 6 weeks  Patient will reach / maintain arm movement: overhead;for home chores;with less pain;in 6 weeks    No Data Recorded  Other functional progress:        Plan / Patient Education:     Continue with initial plan of care.  Progress with home program as tolerated.  Review and progress HEP, shoulder/scap strengthening    Subjective:     Pain Rating:  Resting 2     Response to last treatment: good, exercises are going okay when she is able to do them    HEP- Frequency: 1x/day, Questions or difficulties:  none    Patient reports:      Has not been able to do the exercises every day, but is trying to remember to do them    Shoulder is still sore/tender, especially in the back of the shoulder    Her back has been bothering her and she plans to follow up with her chiropractor.      Objective:       Treatment Today   Manual Therapy  STM left supraspinatus, deltoid, triceps    Exercises:  Exercise #1: Table slide,  flexion/abduction  Comment #1: 10 x 2  Exercise #2: scap squeezes  Comment #2: hold 2-3 seconds, 10x, 2-3x/day  Exercise #3: low rows with yellow band  Comment #3: 8 x 2  Exercise #4: shoulder flexion with yellow band  Comment #4: 8 x 2  Exercise #5: bilateral shoulder ER yellow band  Comment #5: 8 x 2  Exercise #6: seated scalene stretch  Comment #6: hold 25-30 sec, 2-3x             TREATMENT MINUTES COMMENTS   Evaluation     Self-care/ Home management     Manual therapy 20 See above   Neuromuscular Re-education     Therapeutic Activity     Therapeutic Exercises 8 See exercise flow-sheet for details.    Gait training     Modality__________________                Total 28    Blank areas are intentional and mean the treatment did not include these items.     Israel Boogie, SPT  I attest that I was present in the room, making skilled assessments and directing the service provided today.  I was responsible for the assessment and treatment of the patient.  During this time, I was not engaged in treating another patient or doing other tasks.   Edward Newton, PT     Edward Newton, PT  3/8/2019

## 2021-06-24 NOTE — TELEPHONE ENCOUNTER
----- Message from MAYI Strong sent at 2/14/2019 12:52 PM CST -----  Call Mariela: her chest x-ray is normal. I will let her know the lab results likely Monday when these are back and a plan regarding the cough.

## 2021-06-24 NOTE — TELEPHONE ENCOUNTER
Left message for patient to call back    Please relay results to patient if she calls back    Letter also sent

## 2021-06-24 NOTE — PROGRESS NOTES
Internal Medicine Office Visit  [facility]  Patient Name: Mariela Jane  Patient Age: 68 y.o.  YOB: 1950  MRN: [id number]    Date of Visit: 2019  Reason for Office Visit:   Chief Complaint   Patient presents with     Follow-up           Assessment / Plan / Medical Decision Makin. Routine labs/follow-up  Hgb 10.7 g/dL, decrease from 2018 level (12.5) and prior hemoglobin checks. Restart oral ferrous sulfate. Take one tab daily and will recheck Hgb in 3 months.   Lipid profile added to labs for routine lipid monitoring on atorvastatin.    2. Cough  Chest xray, will add on BNP   Consider PPI for cough potentially r/t reflux if chest xray and BNP normal  Follow up if worsening or no better after 4 weeks     3. Shoulder pain, left  Release of information for imaging from [facility] to information regarding prior shoulder pain evaluations. Discussed benefits of physical therapy with patient, referral to outpatient physical therapy. Discussed steroid injection option as well; patient may schedule at this clinic if she decides to proceed with injection.     4. Venous insufficiency, BLE  Counseled patient on monitoring skin in BLE for open areas or infection.   Also discussed monitoring for increased swelling or localized erythema or pain. Patient is considering going back to vascular clinic. She may contact clinic for referral if needed.    Health Maintenance Review  Health Maintenance   Topic Date Due     ZOSTER VACCINES (2 of 2) 2016     FALL RISK ASSESSMENT  2017     MAMMOGRAM  01/10/2019     DXA SCAN  01/10/2019     ADVANCE DIRECTIVES DISCUSSED WITH PATIENT  2021     COLONOSCOPY  2025     TD 18+ HE  2027     PNEUMOCOCCAL POLYSACCHARIDE VACCINE AGE 65 AND OVER  Completed     INFLUENZA VACCINE RULE BASED  Completed     PNEUMOCOCCAL CONJUGATE VACCINE FOR ADULTS (PCV13 OR PREVNAR)  Addressed         I am having  "Mariela Jane maintain her calcium-vitamin D, fexofenadine, pseudoephedrine, ranitidine, ferrous sulfate, UNABLE TO FIND, UNABLE TO FIND, UNABLE TO FIND, atorvastatin, albuterol, hydroCHLOROthiazide, and antiox.mv no.10/omeg3s/lut/nicolette (I-CAPS ORAL). We will continue to administer (lidocaine 10 mg/mL (1 %) 1,120 mg, EPINEPHrine 1.12 mg, sodium bicarbonate 11.2 mEq in sodium chloride 0.9% 1,000 mL (TUMESCENT)) and (lidocaine 10 mg/mL (1 %) 1,120 mg, EPINEPHrine 1.12 mg, sodium bicarbonate 11.2 mEq in sodium chloride 0.9% 1,000 mL (TUMESCENT)).      HPI:  Mariela Jane is a 68 y.o. year old who presents to the office today for routine hemoglobin check. Patient also reports concerns for an ongoing cough, left shoulder pain, and chronic venous insufficiency    Cough- Patient reports she had a sinus infection and bronchitis in December 2018; she was treated with oral antibiotic and steroids. Patient reports he sinus congestion has improved and her cough is less productive. Although, she reports she has a lingering dry cough. She reports the cough is worse just before going to bed and when she wakes up in the AM. She reports mild shortness of breath with activity, such as walking longer distances. She denies fever and reports her energy level has been normal. She has not tried any OTC medications for the cough; she states she has just been \"dealing with it.\"    Shoulder pain, left- Patient endorses left shoulder pain that is exacerbated with movement of the shoulder. She reports she has had arthritis in her shoulder and denies any new injury to this area. She has not been taking OTC pain relievers for the shoulder pain on a regular basis. She is wondering if she would need to see an orthopedic provider, though she is adamant about not wanting surgery. She reports her shoulder pain does restrict daily activities, it is just noticeable with movement or lifting especially. She states she has had an injection in left should " "in the past (>1 year ago).     Venous insufficiency- Patient reports her LLE venous insufficiency is \"about the same.\" She reports she does have some bluish discoloration in her toes and some numbness/tingling in her left foot, which is unchanged from prior symptoms. She also reports some dry skin and itching medial ankle extending into calf. She has been using essential oils to promote circulation, which she thinks has been helping. She reports last year she had a blister injury to her left toe, but this has improved slowly over time.     Review of Systems- pertinent positive in bold:  Constitutional: Denies fever, chills, weight change, fatigue,  dizziness, or sleeping difficulties.  Eyes: Denies redness/eye pain  Ears, nose, mouth, throat: change in hearing, ear pain, hoarseness, difficulty swallowing, sores in the mouth or throat  Respiratory: reports mild shortness of breath with activity, dry cough. Denies sputum production with cough, hemoptysis or wheezing  Cardiovascular: Denies chest pain.  Gastrointestinal: Denies abdominal pain, reports she does have heartburn/indigestion, for which she takes ranitidine. Denies nausea or vomiting. Denies change in appetite, change in bowel habits, constipation or diarrhea.   Hematologic/lymphatic: Denies swollen lymph glands or abnormal bruising/bleeding  Neurologic/emotional: denies concerns for memory changes, reports her mood has been generally good.     Current Scheduled Meds:  Outpatient Encounter Medications as of 2/14/2019   Medication Sig Dispense Refill     albuterol (PROAIR HFA;PROVENTIL HFA;VENTOLIN HFA) 90 mcg/actuation inhaler Inhale 2 puffs every 6 (six) hours as needed for wheezing. 1 each 0     antiox.mv no.10/omeg3s/lut/nicolette (I-CAPS ORAL) Take 2 capsules by mouth daily.       atorvastatin (LIPITOR) 40 MG tablet Take 0.5 tablets (20 mg total) by mouth daily. 90 tablet 3     calcium-vitamin D (CALCIUM-VITAMIN D) 500 mg(1,250mg) -200 unit per tablet Take 1 " tablet by mouth 2 (two) times a day.        ferrous sulfate 325 (65 FE) MG tablet Take 1 tablet by mouth 2 (two) times a day with meals.  30 tablet 3     fexofenadine (ALLEGRA) 180 MG tablet Take 180 mg by mouth daily.       hydroCHLOROthiazide (HYDRODIURIL) 25 MG tablet TAKE 1 TABLET(25 MG) BY MOUTH DAILY 90 tablet 0     pseudoephedrine (SUDAFED) 30 MG tablet Take 30 mg by mouth every 4 (four) hours as needed for congestion (AS NEEDED FOR SINUS CONGESTION OR SINUS HEADACHE).       ranitidine (ZANTAC) 150 MG tablet Take 150 mg by mouth 2 (two) times a day.        UNABLE TO FIND Herbal medications for venous insufficiency and arithritis       UNABLE TO FIND Med Name:Zeynep       UNABLE TO FIND Med Name: Jo Ann       Facility-Administered Encounter Medications as of 2/14/2019   Medication Dose Route Frequency Provider Last Rate Last Dose     lidocaine 10 mg/mL (1 %) 1,120 mg, EPINEPHrine 1.12 mg, sodium bicarbonate 11.2 mEq in sodium chloride 0.9% 1,000 mL (TUMESCENT)  1,000 mL Irrigation Q1H PRN Roman Nguyen MD         lidocaine 10 mg/mL (1 %) 1,120 mg, EPINEPHrine 1.12 mg, sodium bicarbonate 11.2 mEq in sodium chloride 0.9% 1,000 mL (TUMESCENT)  1,000 mL Irrigation Q1H PRN Roman Nguyen MD         Past Medical History:   Diagnosis Date     Anemia     While at [facility]     CNS lymphoma (H) 2014     GERD (gastroesophageal reflux disease)      Multiple rib fractures March 2014     Pelvic fracture (H) march 2014     Primary CNS lymphoma (H)      Sinusitis      Past Surgical History:   Procedure Laterality Date     CATARACT EXTRACTION, BILATERAL Bilateral 2013     HYSTERECTOMY       OOPHORECTOMY      1 removed, 1 remains     MN ARTHROPLASTY TIBIAL PLATEAU      Description: Knee Replacement;  Recorded: 04/11/2012;     MN REMOVAL OF OVARY(S)      Description: Oophorectomy;  Recorded: 12/08/2011;  Comments: one intact-one with fibroma     MN REVISE MEDIAN N/CARPAL TUNNEL SURG      Description: Neuroplasty  Decompression Median Nerve At Carpal Tunnel;  Recorded: 2009;     MI VAGINAL HYSTERECTOMY,UTERUS 250 GMS/<      Description: Vaginal Hysterectomy;  Recorded: 2011;     TOTAL KNEE ARTHROPLASTY Left      Social History     Tobacco Use     Smoking status: Former Smoker     Packs/day: 1.00     Years: 25.00     Pack years: 25.00     Types: Cigarettes     Last attempt to quit: 1985     Years since quittin.0     Smokeless tobacco: Never Used   Substance Use Topics     Alcohol use: No     Comment: occasional     Drug use: No       Objective / Physical Examination:  Vitals:    19 0957   BP: 130/74   Pulse: 84   Weight: 194 lb (88 kg)     Wt Readings from Last 3 Encounters:   19 194 lb (88 kg)   19 193 lb 11.2 oz (87.9 kg)   18 193 lb (87.5 kg)     Body mass index is 30.84 kg/m .     General Appearance: Alert and oriented, cooperative, affect appropriate, speech clear, in no apparent distress  Head: Normocephalic, atraumatic  Ears: Tympanic membrane clear with landmarks well visualized bilaterally. No effusions.  Eyes: PERRL, fundi appear clear bilaterally. Conjunctivae clear and sclerae non-icteric  Nose: Septum midline, nares patent, no visible polyps, mucosa moist and without drainage  Throat: Lips and mucosa moist. Teeth in good repair, pharynx without erythema or exudate  Neck: Supple, trachea midline. No cervical adenopathy  Lungs: Clear to auscultation bilaterally. Normal inspiratory and expiratory effort.  Cardiovascular: Regular rate, normal S1, S2. No murmurs, rubs, or gallops  Extremities: Pedal pulses 2+. No edema. Strength equal throughout. Varicose veins noted left medial calf.   Integumentary: Warm and dry. Without suspicious looking lesions. Intact ROM bilateral upper extremities, though patient exhibits some discomfort in left shoulder with left arm raised above head. No discomfort with palpation to left shoulder.   Neuro: Alert and oriented, follows commands  appropriately.    Orders Placed This Encounter   Procedures     XR Chest 2 Views     BNP(B-type Natriuretic Peptide)     Lipid Profile     Ambulatory referral to PT/OT   Followup: 4 months         Nusrat Youssef, CHERYL    Patient was seen with NP student, Nusrat Youssef. I agree with assessment and plan.  MAYI Portillo

## 2021-06-24 NOTE — PROGRESS NOTES
Patient here today for 6 month follow-up with Dr. Corbin for primary CNS lymphoma/TIFFANY Chatman RN

## 2021-06-24 NOTE — TELEPHONE ENCOUNTER
----- Message from MAYI Strong sent at 2/15/2019  7:20 AM CST -----  Call patient: her lab tests show cholesterol levels which are stable compared to last year and very good. Since her chest xray is normal and the heart lab test is normal, I am going to call in omeprazole 20 mg twice daily to her pharmacy. She should stop taking ranitidine while taking this. I would like to see her back in 1 month to reassess the cough after this.

## 2021-06-24 NOTE — PROGRESS NOTES
[facility] Daily Progress     Patient Name: Mariela Jane  Date: 3/1/2019  Visit #: 2/8  Authorization dates:  02/25/19 - 04/29/19  Referral Diagnosis: Acute pain of left shoulder  Referring provider: Tamia Sher FNP  Visit Diagnosis:     ICD-10-CM    1. Chronic left shoulder pain M25.512     G89.29    2. Generalized muscle weakness M62.81        No Data Recorded     Assessment:     Response to Intervention:  Tolerated therapeutic exercises well in session and demonstrated understanding    Symptoms are consistent with:  Medical diagnosis, chronic left shoulder pain  Patient is appropriate to continue with skilled physical therapy intervention, as indicated by initial plan of care.    Goal Status:  Pt. will demonstrate/verbalize independence in self-management of condition in : 6 weeks  Pt. will be independent with home exercise program in : 6 weeks  Pt. will report decreased intensity, frequency of : in 6 weeks;Pain  Pt. will have improved quality of sleep: with less pain;in 6 weeks  Patient will reach / maintain arm movement: overhead;for home chores;with less pain;in 6 weeks    No Data Recorded  Other functional progress:        Plan / Patient Education:     Continue with initial plan of care.  Progress with home program as tolerated.  Review and progress HEP, shoulder/scap strengthening    Subjective:     Pain Rating:  Resting 2     Response to last treatment: very sore after manual therapy, had to use a heating pad.  HEP- Frequency: 1x/day, Questions or difficulties:  Not sure if she is doing the shoulder squeezes correctly.    Patient reports:      Feeling pretty sore after last session but was able to use heating pad and that felt better    Pain is still pretty much the same since the last visit, but she is able to do the exercises a little bit easier.      Objective:       Treatment Today   Manual Therapy  none    Exercises:  Exercise #1: Table slide, flexion/abduction  Comment #1: 10 x  2  Exercise #2: scap squeezes  Comment #2: hold 2-3 seconds, 10x, 2-3x/day  Exercise #3: low rows with yellow band  Comment #3: 8 x 2  Exercise #4: shoulder flexion with yellow band  Comment #4: 8 x 2  Exercise #5: bilateral shoulder ER yellow band  Comment #5: 8 x 2  Exercise #6: seated scalene stretch  Comment #6: hold 25-30 sec, 2-3x             TREATMENT MINUTES COMMENTS   Evaluation     Self-care/ Home management     Manual therapy     Neuromuscular Re-education     Therapeutic Activity     Therapeutic Exercises 30 See exercise flow-sheet for details.    Gait training     Modality__________________                Total 30    Blank areas are intentional and mean the treatment did not include these items.     Israel Bogoie, SPT  I attest that I was present in the room, making skilled assessments and directing the service provided today.  I was responsible for the assessment and treatment of the patient.  During this time, I was not engaged in treating another patient or doing other tasks.   Edward Newton, PT     Edward Newton, PT  3/1/2019

## 2021-06-24 NOTE — PROGRESS NOTES
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    ECOG Performance   ECOG Performance Status: 0     Distress Assessment  Distress Assessment Score: 5(due to driving conditions today)    Pain  Currently in Pain: No/denies    A 68 y.o. female with history of primary CNS lymphoma initially diagnosed in 2014, status post induction chemotherapy with MRT followed by autologus stem cell transplant on 9/3/14 at [facility].       #.  Primary CNS lymphoma status post chemotherapy with MRT followed by auto stem cell transplant on 9/3/14 at [facility]   -Review the MRI brain results and it did not show any evidence of recurrent lymphoma. According to the NCCN guidelines, I will follow her with exam and MRI brain with contrast every 6 months until September 2019, then annually for at least 5 years.   - RTC 6 months with labs and MRI brain prior, after that, we can move to annual visit/MRI brain.   - She is advised to call us if there is any new concerns or symptoms.     #. H/o Iron deficiency anemia   Hemogram is normal and no concerns for anemia or iron deficiency.    - She had an EGD, colonoscopy and capsule endoscopy in March 2016 at [facility] and all were unremarkable.  She also had a PET scan on the same day and it did not identify any active lymphoma.  She has a mild asymmetric fullness in the right base with SUV 5.6 likely to be inflammatory and low FDG activity right level II cervical lymph nodes with SUV 3.5 likely to be reactive.She was advised to continue on iron supplementation lifelong per her hematologist at [facility].   - She has stopped taking iron in the last several weeks due to diarrhea but she is going to restart.         #.  History of pulmonary embolism,  diagnosed in June 2014.     - treated with anticoagulation.   - She is fully aware of signs and symptoms of venous thromboembolism and time to seek medical attention.     Problem List    1. Primary CNS lymphoma (H)        ______________________________________________________________________________  Cancer history  March 2014: 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  Presented with 2 months history of headache and blurred and double vision.  April 2014 to June 2014-completed 3 cycles of induction MRT (methotrexate was discontinued after 3 cycles due to prolonged profound cytopenia)  9/3/2014- underwent autologous stem cell transplantation with conditioning chemotherapy with BCNU and thiotepa    History of Present Illness    Mariela presented herself today.  She is very happy that she will come to grand kids and one great grandkid in winter 2018.  She has ongoing neuropathy and venous insufficiency in both legs with skin discoloration, and reported it has been helpful with essential oil and compression stocking.  She decided not to follow with vascular surgery.  Mild chronic headache but no changes from her baseline.  She felt on ice in December as mechanical fall.  No other health events in the last 6 months.    Pain Status  Currently in Pain: No/denies    Review of Systems    Constitutional  Constitutional (WDL): All constitutional elements are within defined limits  Neurosensory  Neurosensory (WDL): Exceptions to WDL  Peripheral Motor Neuropathy: None  Ataxia: None  Peripheral Sensory Neuropathy: Asymptomatic, loss of deep tendon reflexes or paresthesia(feet due to venous insufficiency)  Confusion: None  Syncope: None  Eye   Eye Disorder (WDL): All eye disorder elements are within defined limits  Ear  Ear Disorder (WDL): Exceptions to WDL  Ear Pain: None  Tinnitus: Mild symptoms, intervention not indicated(intermittent)  Cardiovascular  Cardiovascular (WDL): Exceptions to  WDL  Palpitations: None  Edema: Yes  Edema Limbs: 5 - 10% inter-limb discrepancy in volume or circumference at point of greatest visible difference, swelling or obscuration of anatomic architecture on close inspection(bilat LE-due to venous insufficiency-wears compression stockings)  Phlebitis: None  Superficial thrombophlebitis: None  Pulmonary  Respiratory (WDL): Exceptions to WDL  Cough: Mild symptoms, nonprescription intervention indicated(occ dry)  Dyspnea: Shortness of breath with moderate exertion  Hypoxia: None  Gastrointestinal  Gastrointestinal (WDL): Exceptions to WDL  Anorexia: None  Constipation: None  Diarrhea: None  Dysphagia: Symptomatic, able to eat regular diet  Esophagitis: Symptomatic, altered eating/swallowing, oral supplements indicated(ranitidine bid controls)  Nausea: None  Pharyngitis: None  Vomiting: None  Dysgeusia: None  Dry Mouth: None  Genitourinary  Genitourinary (WDL): Exceptions to WDL(incontienence)  Urinary Frequency: None  Urinary Retention: None  Urinary Tract Pain: None  Lymphatic  Lymph (WDL): All lymph disorder elements are within defined limits  Musculoskeletal and Connective Tissue  Musculoskeletal and Connetive Tissue Disorders (WDL): All Musculoskeletal and Connetive Tissue Disorder elements are within defined limits  Integumentary  Integumentary (WDL): Exceptions to WDL  Alopecia: None  Rash Maculo-Papular: Macules/papules covering <10% BSA with or without symptoms (e.g., pruritus, burning, tightness)(left ankle, says due to venous insufficiency)  Pruritus: Mild or localized, topical intervention indicated(left ankle)  Urticaria: None  Palmar-Plantar Erythrodysesthesia Syndrome: None  Flushing: None  Patient Coping  Patient Coping: Accepting;Open/discussion  Distress Assessment  Distress Assessment Score: 5(due to driving conditions today)  Accompanied by  Accompanied by: Alone  Oral Chemo Adherence       Past History  Past Medical History:   Diagnosis Date     Anemia      While at [place]     CNS lymphoma (H) 2014     GERD (gastroesophageal reflux disease)      Multiple rib fractures March 2014     Pelvic fracture (H) march 2014     Primary CNS lymphoma (H)      Sinusitis        Physical Exam    Recent Vitals 2/12/2019   Height -   Weight 193 lbs 11 oz   BSA (m2) 2.03 m2   /62   Pulse 81   Temp 98.4   Temp src 1   SpO2 96   Some recent data might be hidden     General: alert, awake, not in acute distress  HEENT: Head: Normal, normocephalic, atraumatic.  Eye: Normal external eye, conjunctiva, lids cornea, DHRUV.  Ears:  Non-tender.  Nose: Normal external nose, mucus membranes and septum.  Pharynx: Dental Hygiene adequate. Normal buccal mucosa. Normal pharynx.  Neck / Thyroid: Supple, no masses, nodes, nodules or enlargement.  Lymphatics: No abnormally enlarged lymph nodes.  Chest: Normal chest wall and respirations. Clear to auscultation.  Heart: S1 S2 RRR, no murmur.   Abdomen: abdomen is soft without significant tenderness, masses, organomegaly or guarding  Extremities: normal strength, tone, and muscle mass  Skin: Left foot from toes to the midfoot with deep purplish color.  No open wounds.  Palpable pulse.  Right foot has slight erythema will but not to a severe degree as left foot.  She is wearing compressive stockings.  CNS: non focal. Slow wide gait.        Lab Results    Recent Results (from the past 168 hour(s))   POCT creatinine   Result Value Ref Range    POC Creatinine 1.1 mg/dL       Imaging    Mr Brain With Without Contrast    Result Date: 2/7/2019  [facility] RADIOLOGY EXAM: MR BRAIN W WO CONTRAST LOCATION: [facility] DATE/TIME: 2/7/2019 2:04 PM INDICATION: Primary cns lymphoma, surveillance COMPARISON: MRI brain September 2, 2018. CONTRAST: Gadavist 8.5 TECHNIQUE: MRI brain without and with IV contrast. FINDINGS: No abnormal restricted diffusion to suggest acute infarct. Focal and confluent areas of signal abnormality within the cerebral hemispheric white  matter which are nonspecific, though most commonly ascribed to chronic small vessel ischemic disease. These also may be posttreatment related. The ventricles and sulci are prominent consistent with moderate brain parenchymal volume loss. No evidence of acute intracranial hemorrhage, mass effect, or extra-axial collection. No evidence of abnormal brain parenchymal or leptomeningeal enhancement to suggest disease recurrence. Postsurgical changes right frontal freedom hole and gradient hypointensity within the right frontal lobe along the superior margin of the right lateral ventricle which likely represents sequela from prior biopsy, unchanged. Expected signal voids within the distal vertebral, basilar, and bilateral internal carotid arteries. Postsurgical changes bilateral cataract surgery. The partially imaged globes are otherwise unremarkable. The partially imaged paranasal sinuses and mastoid air cells are unremarkable. The visualized skull base and calvarium are otherwise unremarkable.     CONCLUSION:  1.  No evidence of acute intracranial hemorrhage, mass effect, or infarction. 2.  No findings to suggest disease recurrence. 3.  Moderate nonspecific white matter changes, which likely represent a combination of chronic small vessel ischemic disease and treatment related changes, unchanged. 4.  Stable post biopsy changes within the right frontal lobe. 5.  Moderate ventriculomegaly, unchanged.    TT: 40 minutes and more than 50% was spent on coordination and counseling of care.    Signed by: Radha Corbin MD
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
## 2021-06-25 NOTE — PROGRESS NOTES
[facility] Discharge Summary  Patient Name: Mariela Jane  Date: 2/4/2020  Referral Diagnosis: Acute pain of left shoulder  Referring provider: aTmia Sher FNP  Visit Diagnosis:   1. Chronic left shoulder pain     2. Generalized muscle weakness         Goals:  No data recorded  No data recorded    Patient was seen for 4 visits ending on 3/18/19.  A trial of independent self management was initiated.  The patient did not return to continue any physical therapy.  Please see attached note for patient status.    Therapy will be discontinued at this time.     Thank you for your referral.  Edward ARMIJO Lamar  2/4/2020  12:25 PM       [facility] Daily Progress     Patient Name: Mariela Jane  Date: 3/18/2019  Visit #: 4/8  Authorization dates:  02/25/19 - 04/29/19  Referral Diagnosis: Acute pain of left shoulder  Referring provider: Tamia Sher FNP  Visit Diagnosis:     ICD-10-CM    1. Chronic left shoulder pain M25.512     G89.29    2. Generalized muscle weakness M62.81        No Data Recorded     Assessment:     Response to Intervention:  Tolerated therapeutic exercises well in session and demonstrated understanding    Symptoms are consistent with:  Medical diagnosis, chronic left shoulder pain  Patient is appropriate to continue with skilled physical therapy intervention, as indicated by initial plan of care.    Goal Status:  Pt. will demonstrate/verbalize independence in self-management of condition in : 6 weeks  Pt. will be independent with home exercise program in : 6 weeks  Pt. will report decreased intensity, frequency of : in 6 weeks;Pain  Pt. will have improved quality of sleep: with less pain;in 6 weeks  Patient will reach / maintain arm movement: overhead;for home chores;with less pain;in 6 weeks    No Data Recorded  Other functional progress:        Plan / Patient Education:     Trial of independent self-management of condition initiated.  Patient to contact PT by  phone or schedule an appointment as needed if symptoms increase or progress stops.  If patient has not returned to continue therapy in 30 days then physical therapy will be discharged.      Subjective:     Pain Rating:  No pain rating, just feels a little sore and achy    Response to last treatment: good, exercises are going okay when she is able to do them    HEP- Frequency: 1x/day, Questions or difficulties:  none    Patient reports:      75% improved from the first visit, more feeling achy or sore now    She feels like overall exercises are going well and she is able to continue to work on them at home by herself    That she has some pain in her knees and may ask her doctor for a referral to be seen for that in the future.      Objective:   L Shoulder ROM:  Shoulder Flexion: 165  Abduction: 150*  Elbow Flexion: WNL  Extension: WNL    L MMT:  Shoulder Flexion: 4+/5  Shoulder Abduction: 4+/5  Elbow Flexion: 5/5  Elbow extension: 5/5  Wrist extension: 4+/5    Treatment Today   Manual Therapy  none    Exercises:  Exercise #1: Table slide, flexion/abduction  Comment #1: 10 x 2  Exercise #2: scap squeezes  Comment #2: hold 2-3 seconds, 10x, 2-3x/day  Exercise #3: low rows with orange band  Comment #3: 8 x 2  Exercise #4: shoulder flexion with orange band  Comment #4: 8 x 2  Exercise #5: bilateral shoulder ER orange band  Comment #5: 8 x 2  Exercise #6: seated scalene stretch  Comment #6: hold 25-30 sec, 2-3x   Exercise #7: Biceps curl with orange band  Comment #7: 8 x 2  Exercise #8: Triceps pull down with orange band  Comment #8: 8 x 2            TREATMENT MINUTES COMMENTS   Evaluation     Self-care/ Home management     Manual therapy  See above   Neuromuscular Re-education     Therapeutic Activity     Therapeutic Exercises 25 See exercise flow-sheet for details.    Gait training     Modality__________________                Total 25    Blank areas are intentional and mean the treatment did not include these items.      Israel Boogie, SPT  I attest that I was present in the room, making skilled assessments and directing the service provided today.  I was responsible for the assessment and treatment of the patient.  During this time, I was not engaged in treating another patient or doing other tasks.   Edward Newton, PT     Edward Newton, PT  3/18/2019

## 2021-07-04 NOTE — LETTER
Letter by Radha Corbin MD at      Author: Radha Corbin MD Service: -- Author Type: --    Filed:  Encounter Date: 6/10/2021 Status: (Other)                   Office Hours: Monday - Friday 8:00 - 4:30PM    Mariela Jane  [address]  [facility] [address]           Juliane 10, 2021      Dear Mariela:    It looks as though you are due to see Dr. Corbin in August. If you would like to schedule this please call [phone]         Sincerely,        [facility]

## 2021-07-26 ENCOUNTER — TRANSFERRED RECORDS (OUTPATIENT)
Dept: HEALTH INFORMATION MANAGEMENT | Facility: CLINIC | Age: 71
End: 2021-07-26

## 2021-08-21 ENCOUNTER — HOSPITAL ENCOUNTER (OUTPATIENT)
Dept: MRI IMAGING | Facility: CLINIC | Age: 71
Discharge: HOME | End: 2021-08-21
Attending: INTERNAL MEDICINE | Admitting: INTERNAL MEDICINE
Payer: COMMERCIAL

## 2021-08-21 DIAGNOSIS — C83.390 PRIMARY CNS LYMPHOMA: ICD-10-CM

## 2021-08-21 PROCEDURE — A9585 GADOBUTROL INJECTION: HCPCS | Performed by: INTERNAL MEDICINE

## 2021-08-21 PROCEDURE — 255N000002 HC RX 255 OP 636: Performed by: INTERNAL MEDICINE

## 2021-08-21 PROCEDURE — 70553 MRI BRAIN STEM W/O & W/DYE: CPT

## 2021-08-21 RX ORDER — GADOBUTROL 604.72 MG/ML
9 INJECTION INTRAVENOUS ONCE
Status: COMPLETED | OUTPATIENT
Start: 2021-08-21 | End: 2021-08-21

## 2021-08-21 RX ADMIN — GADOBUTROL 9 ML: 604.72 INJECTION INTRAVENOUS at 15:02

## 2021-08-31 ENCOUNTER — LAB (OUTPATIENT)
Dept: INFUSION THERAPY | Facility: CLINIC | Age: 71
End: 2021-08-31
Attending: INTERNAL MEDICINE
Payer: COMMERCIAL

## 2021-08-31 ENCOUNTER — ONCOLOGY VISIT (OUTPATIENT)
Dept: ONCOLOGY | Facility: CLINIC | Age: 71
End: 2021-08-31
Attending: INTERNAL MEDICINE
Payer: COMMERCIAL

## 2021-08-31 VITALS
SYSTOLIC BLOOD PRESSURE: 122 MMHG | TEMPERATURE: 98.2 F | WEIGHT: 195 LBS | HEART RATE: 68 BPM | OXYGEN SATURATION: 97 % | DIASTOLIC BLOOD PRESSURE: 68 MMHG | RESPIRATION RATE: 16 BRPM | BODY MASS INDEX: 32.45 KG/M2

## 2021-08-31 DIAGNOSIS — D50.0 IRON DEFICIENCY ANEMIA DUE TO CHRONIC BLOOD LOSS: Primary | ICD-10-CM

## 2021-08-31 DIAGNOSIS — C83.390 PRIMARY CNS LYMPHOMA: ICD-10-CM

## 2021-08-31 DIAGNOSIS — M54.50 BILATERAL LOW BACK PAIN WITHOUT SCIATICA, UNSPECIFIED CHRONICITY: ICD-10-CM

## 2021-08-31 LAB
ALBUMIN SERPL-MCNC: 3.6 G/DL (ref 3.5–5)
ALP SERPL-CCNC: 91 U/L (ref 45–120)
ALT SERPL W P-5'-P-CCNC: 19 U/L (ref 0–45)
ANION GAP SERPL CALCULATED.3IONS-SCNC: 9 MMOL/L (ref 5–18)
AST SERPL W P-5'-P-CCNC: 22 U/L (ref 0–40)
BASOPHILS # BLD AUTO: 0 10E3/UL (ref 0–0.2)
BASOPHILS NFR BLD AUTO: 1 %
BILIRUB SERPL-MCNC: 0.4 MG/DL (ref 0–1)
BUN SERPL-MCNC: 24 MG/DL (ref 8–28)
CALCIUM SERPL-MCNC: 10.2 MG/DL (ref 8.5–10.5)
CHLORIDE BLD-SCNC: 102 MMOL/L (ref 98–107)
CO2 SERPL-SCNC: 30 MMOL/L (ref 22–31)
CREAT SERPL-MCNC: 1.41 MG/DL (ref 0.6–1.1)
EOSINOPHIL # BLD AUTO: 0.2 10E3/UL (ref 0–0.7)
EOSINOPHIL NFR BLD AUTO: 2 %
ERYTHROCYTE [DISTWIDTH] IN BLOOD BY AUTOMATED COUNT: 13.3 % (ref 10–15)
FERRITIN SERPL-MCNC: 14 NG/ML (ref 10–130)
GFR SERPL CREATININE-BSD FRML MDRD: 37 ML/MIN/1.73M2
GLUCOSE BLD-MCNC: 108 MG/DL (ref 70–125)
HCT VFR BLD AUTO: 37.2 % (ref 35–47)
HGB BLD-MCNC: 11.7 G/DL (ref 11.7–15.7)
HOLD SPECIMEN: NORMAL
IMM GRANULOCYTES # BLD: 0 10E3/UL
IMM GRANULOCYTES NFR BLD: 0 %
IRON SATN MFR SERPL: 29 % (ref 15–46)
IRON SERPL-MCNC: 122 UG/DL (ref 35–180)
LDH SERPL L TO P-CCNC: 158 U/L (ref 125–220)
LYMPHOCYTES # BLD AUTO: 1.3 10E3/UL (ref 0.8–5.3)
LYMPHOCYTES NFR BLD AUTO: 19 %
MCH RBC QN AUTO: 29.8 PG (ref 26.5–33)
MCHC RBC AUTO-ENTMCNC: 31.5 G/DL (ref 31.5–36.5)
MCV RBC AUTO: 95 FL (ref 78–100)
MONOCYTES # BLD AUTO: 0.6 10E3/UL (ref 0–1.3)
MONOCYTES NFR BLD AUTO: 8 %
NEUTROPHILS # BLD AUTO: 4.7 10E3/UL (ref 1.6–8.3)
NEUTROPHILS NFR BLD AUTO: 70 %
NRBC # BLD AUTO: 0 10E3/UL
NRBC BLD AUTO-RTO: 0 /100
PLATELET # BLD AUTO: 337 10E3/UL (ref 150–450)
POTASSIUM BLD-SCNC: 3.4 MMOL/L (ref 3.5–5)
PROT SERPL-MCNC: 7.4 G/DL (ref 6–8)
RBC # BLD AUTO: 3.92 10E6/UL (ref 3.8–5.2)
SODIUM SERPL-SCNC: 141 MMOL/L (ref 136–145)
TIBC SERPL-MCNC: 424 UG/DL (ref 240–430)
WBC # BLD AUTO: 6.8 10E3/UL (ref 4–11)

## 2021-08-31 PROCEDURE — 82728 ASSAY OF FERRITIN: CPT | Performed by: INTERNAL MEDICINE

## 2021-08-31 PROCEDURE — 80053 COMPREHEN METABOLIC PANEL: CPT | Performed by: INTERNAL MEDICINE

## 2021-08-31 PROCEDURE — 36415 COLL VENOUS BLD VENIPUNCTURE: CPT

## 2021-08-31 PROCEDURE — 83615 LACTATE (LD) (LDH) ENZYME: CPT | Performed by: INTERNAL MEDICINE

## 2021-08-31 PROCEDURE — G0463 HOSPITAL OUTPT CLINIC VISIT: HCPCS

## 2021-08-31 PROCEDURE — 99214 OFFICE O/P EST MOD 30 MIN: CPT | Performed by: INTERNAL MEDICINE

## 2021-08-31 PROCEDURE — 83550 IRON BINDING TEST: CPT | Performed by: INTERNAL MEDICINE

## 2021-08-31 PROCEDURE — 85025 COMPLETE CBC W/AUTO DIFF WBC: CPT | Performed by: INTERNAL MEDICINE

## 2021-08-31 ASSESSMENT — PAIN SCALES - GENERAL: PAINLEVEL: MILD PAIN (2)

## 2021-08-31 NOTE — PROGRESS NOTES
[facility] Hematology and Oncology Progress Note    Patient: Mariela Jane  MRN: [id number]  Date of Service: Aug 31, 2021         Reason for Visit    Chief Complaint   Patient presents with     Oncology Clinic Visit       Assessment and Plan    Cancer Staging  Primary CNS lymphoma (H)  Staging form: Pediatric Non-Hodgkins Lymphoma, Pediatric  - Clinical: No stage assigned - Unsigned      ECOG Performance    1 - Can't do physically strenuous work, but fully ambyulatory and can do light sedentary work     Pain  Pain Score: Mild Pain (2)    #.  Primary CNS lymphoma   She is clinically and neurologically stable.  Observe reviewed and her CBC and differential is unremarkable.  However her hemoglobin has declined slightly compared to the hemoglobin from 5 months ago.  Her renal function stable.  LDH is normal.  MRI brain was reviewed with the patient and there is no signs of recurrence of CNS lymphoma.   Will continue to follow annual exam with labs, MRI in 1 year per NCCN guidelines.  She is advised to call us sooner with any concerns.    #.  History of iron deficiency anemia   She had EGD, colonoscopy and capsule endoscopy in March 2016 at [facility] and all were unremarkable.  She also had a PET scan on the same day and did not identify any pathology.  She received IV iron periodically and last was in 2020.  She did not tolerate oral iron due to GI side effects.     She is currently on multivitamin with iron and diligent on taking iron rich food.   Will follow ferritin level today and call her with the result.    #.  CKD-3, stable.    #.  Subacute low back pain without radiculopathy or neurologic symptoms.   Referral to physical therapy was made today.    #.  History of pulmonary emboli, diagnosed in 6/2014   Completed anticoagulation therapy at that time.  No recurrence.    Encounter Diagnoses:    Problem List Items Addressed This Visit        Oncology Diagnoses    Primary CNS lymphoma (H)    Relevant Orders     CBC with Platelets & Differential (Completed)    Comprehensive metabolic panel (Completed)    Lactate Dehydrogenase (Completed)    Check Out Appointment Request       Other    Low back pain    Relevant Orders    Physical Therapy Referral      Other Visit Diagnoses     Iron deficiency anemia due to chronic blood loss    -  Primary    Relevant Orders    Iron & Iron Binding Capacity    Ferritin             CC: Carlota Maradiaga MD   ______________________________________________________________________________  Cancer history  March 2014: 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  Presented with 2 months history of headache and blurred and double vision.  April 2014 to June 2014-completed 3 cycles of induction MRT (methotrexate was discontinued after 3 cycles due to prolonged profound cytopenia) at [facility]  9/3/2014- underwent autologous stem cell transplantation with conditioning chemotherapy with BCNU and thiotepa at [facility].    History of Present Illness    Ms. Mariela Jane presented herself today for follow-up.  She has mild intermittent headaches which is unchanged from her baseline.  No new neurologic symptoms.  She is doing exercise program at her apartment and there is a  working with them.  She noted low back pain in the last few months.  No injury.  No radiating pain. She does not take any medication.  She noted with certain movement especially was exacerbated while she had MRI brain that she needed to lay for a period of time.  Has not done physical therapy.      Review of systems  Apart from describing in HPI, the remainder of comprehensive ROS was negative.    Past History    Past Medical History:   Diagnosis Date     Anemia     While at [place]     CNS lymphoma (H) 2014     GERD (gastroesophageal reflux disease)      Multiple rib fractures March 2014     Pelvic fracture (H) march 2014     Primary CNS lymphoma (H)      Sinusitis        Past Surgical  History:   Procedure Laterality Date     C REMOVAL OF OVARY(S)      Description: Oophorectomy;  Recorded: 12/08/2011;  Comments: one intact-one with fibroma     CATARACT EXTRACTION, BILATERAL Bilateral 2013     HC REVISE MEDIAN N/CARPAL TUNNEL SURG      Description: Neuroplasty Decompression Median Nerve At Carpal Tunnel;  Recorded: 01/26/2009;     HYSTERECTOMY       OOPHORECTOMY      1 removed, 1 remains     [place] ARTHROPLASTY TIBIAL PLATEAU      Description: Knee Replacement;  Recorded: 04/11/2012;     [place] VAGINAL HYSTERECTOMY,UTERUS 250 GMS/<      Description: Vaginal Hysterectomy;  Recorded: 12/08/2011;     TOTAL KNEE ARTHROPLASTY Left 2002         Physical Exam    /68 (BP Location: Left arm, Cuff Size: Adult Regular)   Pulse 68   Temp 98.2  F (36.8  C) (Oral)   Resp 16   Wt 88.5 kg (195 lb)   SpO2 97%   BMI 32.45 kg/m        General: alert, awake, not in acute distress  HEENT: Head: Normal, normocephalic, atraumatic.  Eye: Normal external eye, conjunctiva, lids cornea, DHRUV.  Nose: Normal external nose, mucus membranes and septum.  Pharynx: Normal buccal mucosa. Normal pharynx.  Neck / Thyroid: Supple, no masses, nodes, nodules or enlargement.  Lymphatics: No abnormally enlarged lymph nodes.  Chest: Normal chest wall and respirations. Clear to auscultation.  Heart: S1 S2 RRR, no murmur.   Abdomen: abdomen is soft without significant tenderness, masses, organomegaly or guarding  Extremities: normal strength, tone, and muscle mass  Skin: Sebaceous cyst in the mid upper back.  CNS: non focal.    Lab Results    Recent Results (from the past 168 hour(s))   Extra Red Top Tube   Result Value Ref Range    Hold Specimen JIC    Extra Green Top (Lithium Heparin) Tube   Result Value Ref Range    Hold Specimen JIC    Extra Purple Top Tube   Result Value Ref Range    Hold Specimen JIC    Comprehensive metabolic panel   Result Value Ref Range    Sodium 141 136 - 145 mmol/L    Potassium 3.4 (L) 3.5 - 5.0 mmol/L     Chloride 102 98 - 107 mmol/L    Carbon Dioxide (CO2) 30 22 - 31 mmol/L    Anion Gap 9 5 - 18 mmol/L    Urea Nitrogen 24 8 - 28 mg/dL    Creatinine 1.41 (H) 0.60 - 1.10 mg/dL    Calcium 10.2 8.5 - 10.5 mg/dL    Glucose 108 70 - 125 mg/dL    Alkaline Phosphatase 91 45 - 120 U/L    AST 22 0 - 40 U/L    ALT 19 0 - 45 U/L    Protein Total 7.4 6.0 - 8.0 g/dL    Albumin 3.6 3.5 - 5.0 g/dL    Bilirubin Total 0.4 0.0 - 1.0 mg/dL    GFR Estimate 37 (L) >60 mL/min/1.73m2   Lactate Dehydrogenase   Result Value Ref Range    Lactate Dehydrogenase 158 125 - 220 U/L   CBC with platelets and differential   Result Value Ref Range    WBC Count 6.8 4.0 - 11.0 10e3/uL    RBC Count 3.92 3.80 - 5.20 10e6/uL    Hemoglobin 11.7 11.7 - 15.7 g/dL    Hematocrit 37.2 35.0 - 47.0 %    MCV 95 78 - 100 fL    MCH 29.8 26.5 - 33.0 pg    MCHC 31.5 31.5 - 36.5 g/dL    RDW 13.3 10.0 - 15.0 %    Platelet Count 337 150 - 450 10e3/uL    % Neutrophils 70 %    % Lymphocytes 19 %    % Monocytes 8 %    % Eosinophils 2 %    % Basophils 1 %    % Immature Granulocytes 0 %    NRBCs per 100 WBC 0 <1 /100    Absolute Neutrophils 4.7 1.6 - 8.3 10e3/uL    Absolute Lymphocytes 1.3 0.8 - 5.3 10e3/uL    Absolute Monocytes 0.6 0.0 - 1.3 10e3/uL    Absolute Eosinophils 0.2 0.0 - 0.7 10e3/uL    Absolute Basophils 0.0 0.0 - 0.2 10e3/uL    Absolute Immature Granulocytes 0.0 <=0.0 10e3/uL    Absolute NRBCs 0.0 10e3/uL       Imaging    MR Brain w/o & w Contrast    Result Date: 8/23/2021  EXAM: MR BRAIN W/O AND W CONTRAST LOCATION: [facility] DATE/TIME: 8/21/2021 2:34 PM INDICATION: CNS lymphoma, surveillance. COMPARISON: 8/20/2020 MR brain. CONTRAST: 9 mL Gadavist. TECHNIQUE: Routine multiplanar multisequence head MRI without and with intravenous contrast. FINDINGS: INTRACRANIAL CONTENTS: No evidence for acute or subacute infarction. No evidence for restricted diffusion abnormality. Post procedure changes of right frontal freedom hole and postbiopsy  changes right frontal lobe with stable chronic blood product focus in the anterior right frontal lobe deep white matter. Nonenhancing hyperintense T2/FLAIR signal abnormality persists in the course of the biopsy tract and deep white matter of both cerebral hemispheres likely represent a combination of posttreatment change and/or chronic small vessel ischemic/degenerative changes of aging. The degree of white matter nonenhancing signal abnormality is comparable to previous evaluation. No additional evidence for restricted diffusion abnormality. No extraaxial blood products  or fluid collections. No additional hemorrhage is noted intracranially. Patchy nonspecific T2/FLAIR hyperintensities within the cerebral white matter and irina most consistent with mild to moderate chronic microvascular ischemic change. Mild generalized cerebral atrophy. No hydrocephalus. Normal position of the cerebellar tonsils. No pathologic enhancement. No specific MR evidence for recurrent/residual neoplasm intracranially. No pathologic marrow enhancement or orbital enhancement is identified. Meckel's cave and cavernous sinus regions are satisfactory. SELLA: No abnormality accounting for technique. OSSEOUS STRUCTURES/SOFT TISSUES: Normal marrow signal. The major intracranial vascular flow voids are maintained. ORBITS: No acute orbital enhancement abnormality is noted. Procedure changes both globes. SINUSES/MASTOIDS: Mild membrane thickening ethmoid air cells. Right maxillary sinus mucous retention cyst. Minimal fluid in the mastoid air cells on the right.     IMPRESSION: 1.  Overall stable appearance from previous evaluation 8/20/2020. 2.  No evidence for recurrent/residual neoplasm. No pathologic enhancement 3.  Postbiopsy/posttreatment changes right frontal lobe including a stable focus of chronic blood products in the deep white matter anteriorly. 4.  No intracranial mass, mass effect, or evidence for recent infarction. 5.  Additional  details including chronic small vessel ischemic changes, are detailed above and stable.      TT: 30 minutes: time consisted of preparing to see the patient (eg. review of tests/medical records)-5 minutes, obtaining an/or reviewing separately obtained history, focused examination, review of the diagnosis, review of management plan (20 minutes), ordering medication, test or procedure, referring and communicating with other healthcare professionals, and documenting clinical information in the electronic or other health record ( 5 minutes).    Signed by: Radha Corbin MD

## 2021-08-31 NOTE — LETTER
"    8/31/2021         RE: Mariela Jane  [address]  [facility] [address]        Dear Colleague,    Thank you for referring your patient, Mariela Jane, to the [facility] [place]. Please see a copy of my visit note below.    Oncology Rooming Note    August 31, 2021 8:26 AM   Mariela Jane is a 71 year old female who presents for:    Chief Complaint   Patient presents with     Oncology Clinic Visit     Initial Vitals: /68 (BP Location: Left arm, Cuff Size: Adult Regular)   Pulse 68   Temp 98.2  F (36.8  C) (Oral)   Resp 16   Wt 88.5 kg (195 lb)   SpO2 97%   BMI 32.45 kg/m   Estimated body mass index is 32.45 kg/m  as calculated from the following:    Height as of 1/27/20: 1.651 m (5' 5\").    Weight as of this encounter: 88.5 kg (195 lb). Body surface area is 2.01 meters squared.  Mild Pain (2) Comment: Data Unavailable   No LMP recorded.  Allergies reviewed: Yes  Medications reviewed: Yes    Medications: Medication refills not needed today.  Pharmacy name entered into [employer]: [id number] - [place], [place] - [address] [place] [facility] [place] [address]    Clinical concerns: Noticed lower back discomfort past few months. Certain movements cause flare ups. Has not taken any NSAIDS.    Tamia García                [facility] Hematology and Oncology Progress Note    Patient: Mariela Jane  MRN: [id number]  Date of Service: Aug 31, 2021         Reason for Visit    Chief Complaint   Patient presents with     Oncology Clinic Visit       Assessment and Plan    Cancer Staging  Primary CNS lymphoma (H)  Staging form: Pediatric Non-Hodgkins Lymphoma, Pediatric  - Clinical: No stage assigned - Unsigned      ECOG Performance    1 - Can't do physically strenuous work, but fully ambyulatory and can do light sedentary work     Pain  Pain Score: Mild Pain (2)    #.  Primary CNS lymphoma   She is clinically and neurologically stable.  " Observe reviewed and her CBC and differential is unremarkable.  However her hemoglobin has declined slightly compared to the hemoglobin from 5 months ago.  Her renal function stable.  LDH is normal.  MRI brain was reviewed with the patient and there is no signs of recurrence of CNS lymphoma.   Will continue to follow annual exam with labs, MRI in 1 year per NCCN guidelines.  She is advised to call us sooner with any concerns.    #.  History of iron deficiency anemia   She had EGD, colonoscopy and capsule endoscopy in March 2016 at [facility] and all were unremarkable.  She also had a PET scan on the same day and did not identify any pathology.  She received IV iron periodically and last was in 2020.  She did not tolerate oral iron due to GI side effects.     She is currently on multivitamin with iron and diligent on taking iron rich food.   Will follow ferritin level today and call her with the result.    #.  CKD-3, stable.    #.  Subacute low back pain without radiculopathy or neurologic symptoms.   Referral to physical therapy was made today.    #.  History of pulmonary emboli, diagnosed in 6/2014   Completed anticoagulation therapy at that time.  No recurrence.    Encounter Diagnoses:    Problem List Items Addressed This Visit        Oncology Diagnoses    Primary CNS lymphoma (H)    Relevant Orders    CBC with Platelets & Differential (Completed)    Comprehensive metabolic panel (Completed)    Lactate Dehydrogenase (Completed)    Check Out Appointment Request       Other    Low back pain    Relevant Orders    Physical Therapy Referral      Other Visit Diagnoses     Iron deficiency anemia due to chronic blood loss    -  Primary    Relevant Orders    Iron & Iron Binding Capacity    Ferritin             CC: Carlota Maradiaga MD   ______________________________________________________________________________  Cancer history  March 2014: 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   Presented with 2 months history of headache and blurred and double vision.  April 2014 to June 2014-completed 3 cycles of induction MRT (methotrexate was discontinued after 3 cycles due to prolonged profound cytopenia) at [facility]  9/3/2014- underwent autologous stem cell transplantation with conditioning chemotherapy with BCNU and thiotepa at [facility].    History of Present Illness    Ms. Mariela Jane presented herself today for follow-up.  She has mild intermittent headaches which is unchanged from her baseline.  No new neurologic symptoms.  She is doing exercise program at her apartment and there is a  working with them.  She noted low back pain in the last few months.  No injury.  No radiating pain. She does not take any medication.  She noted with certain movement especially was exacerbated while she had MRI brain that she needed to lay for a period of time.  Has not done physical therapy.      Review of systems  Apart from describing in HPI, the remainder of comprehensive ROS was negative.    Past History    Past Medical History:   Diagnosis Date     Anemia     While at [place]     CNS lymphoma (H) 2014     GERD (gastroesophageal reflux disease)      Multiple rib fractures March 2014     Pelvic fracture (H) march 2014     Primary CNS lymphoma (H)      Sinusitis        Past Surgical History:   Procedure Laterality Date     C REMOVAL OF OVARY(S)      Description: Oophorectomy;  Recorded: 12/08/2011;  Comments: one intact-one with fibroma     CATARACT EXTRACTION, BILATERAL Bilateral 2013     HC REVISE MEDIAN N/CARPAL TUNNEL SURG      Description: Neuroplasty Decompression Median Nerve At Carpal Tunnel;  Recorded: 01/26/2009;     HYSTERECTOMY       OOPHORECTOMY      1 removed, 1 remains     [place] ARTHROPLASTY TIBIAL PLATEAU      Description: Knee Replacement;  Recorded: 04/11/2012;     [place] VAGINAL HYSTERECTOMY,UTERUS 250 GMS/<      Description: Vaginal Hysterectomy;  Recorded: 12/08/2011;     TOTAL KNEE  ARTHROPLASTY Left 2002         Physical Exam    /68 (BP Location: Left arm, Cuff Size: Adult Regular)   Pulse 68   Temp 98.2  F (36.8  C) (Oral)   Resp 16   Wt 88.5 kg (195 lb)   SpO2 97%   BMI 32.45 kg/m        General: alert, awake, not in acute distress  HEENT: Head: Normal, normocephalic, atraumatic.  Eye: Normal external eye, conjunctiva, lids cornea, DHRUV.  Nose: Normal external nose, mucus membranes and septum.  Pharynx: Normal buccal mucosa. Normal pharynx.  Neck / Thyroid: Supple, no masses, nodes, nodules or enlargement.  Lymphatics: No abnormally enlarged lymph nodes.  Chest: Normal chest wall and respirations. Clear to auscultation.  Heart: S1 S2 RRR, no murmur.   Abdomen: abdomen is soft without significant tenderness, masses, organomegaly or guarding  Extremities: normal strength, tone, and muscle mass  Skin: Sebaceous cyst in the mid upper back.  CNS: non focal.    Lab Results    Recent Results (from the past 168 hour(s))   Extra Red Top Tube   Result Value Ref Range    Hold Specimen JIC    Extra Green Top (Lithium Heparin) Tube   Result Value Ref Range    Hold Specimen JIC    Extra Purple Top Tube   Result Value Ref Range    Hold Specimen JIC    Comprehensive metabolic panel   Result Value Ref Range    Sodium 141 136 - 145 mmol/L    Potassium 3.4 (L) 3.5 - 5.0 mmol/L    Chloride 102 98 - 107 mmol/L    Carbon Dioxide (CO2) 30 22 - 31 mmol/L    Anion Gap 9 5 - 18 mmol/L    Urea Nitrogen 24 8 - 28 mg/dL    Creatinine 1.41 (H) 0.60 - 1.10 mg/dL    Calcium 10.2 8.5 - 10.5 mg/dL    Glucose 108 70 - 125 mg/dL    Alkaline Phosphatase 91 45 - 120 U/L    AST 22 0 - 40 U/L    ALT 19 0 - 45 U/L    Protein Total 7.4 6.0 - 8.0 g/dL    Albumin 3.6 3.5 - 5.0 g/dL    Bilirubin Total 0.4 0.0 - 1.0 mg/dL    GFR Estimate 37 (L) >60 mL/min/1.73m2   Lactate Dehydrogenase   Result Value Ref Range    Lactate Dehydrogenase 158 125 - 220 U/L   CBC with platelets and differential   Result Value Ref Range    WBC  Count 6.8 4.0 - 11.0 10e3/uL    RBC Count 3.92 3.80 - 5.20 10e6/uL    Hemoglobin 11.7 11.7 - 15.7 g/dL    Hematocrit 37.2 35.0 - 47.0 %    MCV 95 78 - 100 fL    MCH 29.8 26.5 - 33.0 pg    MCHC 31.5 31.5 - 36.5 g/dL    RDW 13.3 10.0 - 15.0 %    Platelet Count 337 150 - 450 10e3/uL    % Neutrophils 70 %    % Lymphocytes 19 %    % Monocytes 8 %    % Eosinophils 2 %    % Basophils 1 %    % Immature Granulocytes 0 %    NRBCs per 100 WBC 0 <1 /100    Absolute Neutrophils 4.7 1.6 - 8.3 10e3/uL    Absolute Lymphocytes 1.3 0.8 - 5.3 10e3/uL    Absolute Monocytes 0.6 0.0 - 1.3 10e3/uL    Absolute Eosinophils 0.2 0.0 - 0.7 10e3/uL    Absolute Basophils 0.0 0.0 - 0.2 10e3/uL    Absolute Immature Granulocytes 0.0 <=0.0 10e3/uL    Absolute NRBCs 0.0 10e3/uL       Imaging    MR Brain w/o & w Contrast    Result Date: 8/23/2021  EXAM: MR BRAIN W/O AND W CONTRAST LOCATION: [facility] DATE/TIME: 8/21/2021 2:34 PM INDICATION: CNS lymphoma, surveillance. COMPARISON: 8/20/2020 MR brain. CONTRAST: 9 mL Gadavist. TECHNIQUE: Routine multiplanar multisequence head MRI without and with intravenous contrast. FINDINGS: INTRACRANIAL CONTENTS: No evidence for acute or subacute infarction. No evidence for restricted diffusion abnormality. Post procedure changes of right frontal freedom hole and postbiopsy changes right frontal lobe with stable chronic blood product focus in the anterior right frontal lobe deep white matter. Nonenhancing hyperintense T2/FLAIR signal abnormality persists in the course of the biopsy tract and deep white matter of both cerebral hemispheres likely represent a combination of posttreatment change and/or chronic small vessel ischemic/degenerative changes of aging. The degree of white matter nonenhancing signal abnormality is comparable to previous evaluation. No additional evidence for restricted diffusion abnormality. No extraaxial blood products  or fluid collections. No additional hemorrhage is  noted intracranially. Patchy nonspecific T2/FLAIR hyperintensities within the cerebral white matter and irina most consistent with mild to moderate chronic microvascular ischemic change. Mild generalized cerebral atrophy. No hydrocephalus. Normal position of the cerebellar tonsils. No pathologic enhancement. No specific MR evidence for recurrent/residual neoplasm intracranially. No pathologic marrow enhancement or orbital enhancement is identified. Meckel's cave and cavernous sinus regions are satisfactory. SELLA: No abnormality accounting for technique. OSSEOUS STRUCTURES/SOFT TISSUES: Normal marrow signal. The major intracranial vascular flow voids are maintained. ORBITS: No acute orbital enhancement abnormality is noted. Procedure changes both globes. SINUSES/MASTOIDS: Mild membrane thickening ethmoid air cells. Right maxillary sinus mucous retention cyst. Minimal fluid in the mastoid air cells on the right.     IMPRESSION: 1.  Overall stable appearance from previous evaluation 8/20/2020. 2.  No evidence for recurrent/residual neoplasm. No pathologic enhancement 3.  Postbiopsy/posttreatment changes right frontal lobe including a stable focus of chronic blood products in the deep white matter anteriorly. 4.  No intracranial mass, mass effect, or evidence for recent infarction. 5.  Additional details including chronic small vessel ischemic changes, are detailed above and stable.      TT: 30 minutes: time consisted of preparing to see the patient (eg. review of tests/medical records)-5 minutes, obtaining an/or reviewing separately obtained history, focused examination, review of the diagnosis, review of management plan (20 minutes), ordering medication, test or procedure, referring and communicating with other healthcare professionals, and documenting clinical information in the electronic or other health record ( 5 minutes).    Signed by: Radha Corbin MD        Again, thank you for allowing me to participate in  the care of your patient.        Sincerely,        Radha Corbin MD

## 2021-08-31 NOTE — PROGRESS NOTES
"Oncology Rooming Note    August 31, 2021 8:26 AM   Mariela Jane is a 71 year old female who presents for:    Chief Complaint   Patient presents with     Oncology Clinic Visit     Initial Vitals: /68 (BP Location: Left arm, Cuff Size: Adult Regular)   Pulse 68   Temp 98.2  F (36.8  C) (Oral)   Resp 16   Wt 88.5 kg (195 lb)   SpO2 97%   BMI 32.45 kg/m   Estimated body mass index is 32.45 kg/m  as calculated from the following:    Height as of 1/27/20: 1.651 m (5' 5\").    Weight as of this encounter: 88.5 kg (195 lb). Body surface area is 2.01 meters squared.  Mild Pain (2) Comment: Data Unavailable   No LMP recorded.  Allergies reviewed: Yes  Medications reviewed: Yes    Medications: Medication refills not needed today.  Pharmacy name entered into [facility]: [id number] [address] AT [facility] [address]    Clinical concerns: Noticed lower back discomfort past few months. Certain movements cause flare ups. Has not taken any NSAIDS.    Tamia García              "

## 2021-09-01 ENCOUNTER — HOSPITAL ENCOUNTER (OUTPATIENT)
Dept: PHYSICAL THERAPY | Facility: REHABILITATION | Age: 71
End: 2021-09-01
Attending: INTERNAL MEDICINE
Payer: COMMERCIAL

## 2021-09-01 ENCOUNTER — TELEPHONE (OUTPATIENT)
Dept: ONCOLOGY | Facility: CLINIC | Age: 71
End: 2021-09-01

## 2021-09-01 DIAGNOSIS — C83.390 PRIMARY CNS LYMPHOMA: ICD-10-CM

## 2021-09-01 DIAGNOSIS — G89.29 CHRONIC BILATERAL LOW BACK PAIN WITHOUT SCIATICA: Primary | ICD-10-CM

## 2021-09-01 DIAGNOSIS — M54.50 BILATERAL LOW BACK PAIN WITHOUT SCIATICA, UNSPECIFIED CHRONICITY: ICD-10-CM

## 2021-09-01 DIAGNOSIS — E61.1 IRON DEFICIENCY: Primary | ICD-10-CM

## 2021-09-01 DIAGNOSIS — M54.50 CHRONIC BILATERAL LOW BACK PAIN WITHOUT SCIATICA: Primary | ICD-10-CM

## 2021-09-01 PROCEDURE — 97161 PT EVAL LOW COMPLEX 20 MIN: CPT | Mod: GP | Performed by: PHYSICAL THERAPIST

## 2021-09-01 NOTE — PROGRESS NOTES
Rehabilitation Services          OUTPATIENT PHYSICAL THERAPY ORTHOPEDIC EVALUATION  PLAN OF TREATMENT FOR OUTPATIENT REHABILITATION  (COMPLETE FOR INITIAL CLAIMS ONLY)  Patient's Last Name, First Name, M.I.  YOB: 1950  Mariela Jane    Provider s Name:  Yvonne Garay PT   Medical Record No.  [id number]   Start of Care Date:  09/01/21   Onset Date:   July 1 2021   Type:     _X__PT   ___OT   ___SLP Medical Diagnosis:  Bilateral low back pain without sciatica, unspecified chronicity M54.5      PT Diagnosis:  Lower back pain   Visits from SOC:  1      _________________________________________________________________________________  Plan of Treatment/Functional Goals:  bed mobility training, joint mobilization, manual therapy, neuromuscular re-education, ROM, strengthening, stretching, Electrical stimulation     Goals  Goal Identifier: bed mobility  Goal Description: patient will perform bed mobility without pain in order to sleep well in 12 weeks  Target Date: 11/26/21    Goal Identifier: walking  Goal Description: patient will walk without back pain in 12 weeks  Target Date: 11/26/21     Therapy Frequency:  1 time/week  Predicted Duration of Therapy Intervention:  12 weeks    Yvonne Garay PT                 I CERTIFY THE NEED FOR THESE SERVICES FURNISHED UNDER        THIS PLAN OF TREATMENT AND WHILE UNDER MY CARE     (Physician co-signature of this document indicates review and certification of the therapy plan).                       Certification Date From:  09/01/21   Certification Date To:  11/26/21    Referring Provider:  Dr. Corbin    Initial Assessment        See Epic Evaluation Start of Care Date: 09/01/21 09/01/21 0900   General Information   Type of Visit Initial OP Ortho PT Evaluation   Start of Care Date 09/01/21   Referring Physician Dr. Corbin   Patient/Family Goals Statement feel less pain when rolling over in bed.   Orders Evaluate and Treat   Date of Order  "08/31/21   Certification Required? Yes   Medical Diagnosis Bilateral low back pain without sciatica, unspecified chronicity M54.5    Surgical/Medical history reviewed Yes   Precautions/Limitations no known precautions/limitations   Body Part(s)   Body Part(s) Lumbar Spine/SI   Presentation and Etiology   Pertinent history of current problem (include personal factors and/or comorbidities that impact the POC) Subjective: Pain started about a month ago without known reason. Though, she did start silver Sneakers recently. Back bothers her when she is in bed trying to adjust positions, walking/standing. Today, she doesn't have any pain, she states \"it's just a good day.\" She does have bilateral knee pain, left has been replaced, is considering the right one at some point. Patient does have some tingling and weakness in right knee, otherwise none in LEs. 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 Has left TKA in 2003   Onset date of current episode/exacerbation 07/01/21   Fall Risk Screen   Have you fallen 2 or more times in the past year? No   Have you fallen and had an injury in the past year? No   Is patient a fall risk? Yes   Fall screen comments right knee pain limits function   Abuse Screen (yes response referral indicated)   Feels Unsafe at Home or Work/School no   Feels Threatened by Someone no   Does Anyone Try to Keep You From Having Contact with Others or Doing Things Outside Your Home? no   Physical Signs of Abuse Present no   Lumbar Spine/SI Objective Findings   Observation FHP, sacral sits, walks slowly, stands with weight mostly on left leg.    Flexion ROM mild limit, not painful today   Extension ROM major limit, stiff and brings on neck pain too.   Right Side Bending ROM moderate limit   Left Side Bending ROM moderate limit   Hip Screen supine hooklying knee fall out: moderate limit in ROM, but doesn't " illicit pain. Supine knee to chest, brings on significant back pain.   Transversus Abdominus Strength (Mike Leg Lowering-deg) difficulty engaging abdominals in supine   Hip Flexion (L2) Strength 4 bilat   Knee Flexion Strength 5 bilat   Knee Extension (L3) Strength 4 R (right knee pain), 5 L   Ankle Dorsiflexion (L4) Strength 5 bilat   SLR 30 degrees and positive for left lower back pain, 50 degrees right and brings on right leg stretch and back pain   Planned Therapy Interventions   Planned Therapy Interventions bed mobility training;joint mobilization;manual therapy;neuromuscular re-education;ROM;strengthening;stretching   Planned Modality Interventions   Planned Modality Interventions Electrical stimulation   Clinical Impression   Criteria for Skilled Therapeutic Interventions Met yes, treatment indicated   PT Diagnosis Lower back pain   Influenced by the following impairments decreased ROM, tender to palpation, poor posture   Functional limitations due to impairments bed mobility, standing, walking   Clinical Presentation Stable/Uncomplicated   Clinical Presentation Rationale comorbidities   Clinical Decision Making (Complexity) Low complexity   Therapy Frequency 1 time/week   Predicted Duration of Therapy Intervention (days/wks) 12 weeks   Risk & Benefits of therapy have been explained Yes   Patient, Family & other staff in agreement with plan of care Yes   Clinical Impression Comments Assessment: Patient presents to PT with acute lower back pain that started about 2 months ago without known onset. Her back is possibly aggravated by starting exercise routine in the past 2 months and she has been sedentary prior to this and/or her right knee pain getting worse and leading to compensatory movements. She demonstrated significant pain reaction when laying supine and trying to do small movements in clinic, possibly facet related pain/muscle spasm. Patient would benefit from further PT to improve back pain and  improve function especially while in bad.    ORTHO GOALS   PT Ortho Eval Goals 1;2;3   Ortho Goal 1   Goal Identifier bed mobility   Goal Description patient will perform bed mobility without pain in order to sleep well in 12 weeks   Target Date 11/26/21   Ortho Goal 2   Goal Identifier walking   Goal Description patient will walk without back pain in 12 weeks   Target Date 11/26/21   Total Evaluation Time   PT Eval, Low Complexity Minutes (66570) 35   Therapy Certification   Certification date from 09/01/21   Certification date to 11/26/21   Medical Diagnosis Bilateral low back pain without sciatica, unspecified chronicity M54.5                                                                         Exercises  Date 9/1/2021    Exercise    Supine Posterior pelvic tilting Ed to start 5x of each at home   Supine deep breathing Ed to start 5x of each at home   Supine shoulder flexion Ed to start 5x of each at home

## 2021-09-01 NOTE — TELEPHONE ENCOUNTER
1132:  Dr. Corbin asked that I call patient to let her know that her ferritin level is trending down. She requests patient take ferrous sulfate 1 tablet/daily or slow iron 1 tablet two times per day. Left message for patient to return my call/TIFFANY Chatman RN    8174:  Mariela returned my call. I told her that her ferritin level is trending down. Result given. I told her that Dr. Corbin would like her to take iron. Options above given. She said she tried taking iron in past and caused upset stomach. She will try slow iron 1 tablet two times per day and if she is unable to tolerate it, she will call us back/TIFFANY Chatman RN     9631:  Dr. Corbin updated. Dr. Corbin would like patient to have labs rechecked in 3-4 mo. Called patient with this update. She would like to schedule today. Transferred call to ONOFRE Pina to schedule.    Lab appt scheduled for 12/3/21/TIFFANY Chatman RN

## 2021-09-16 ENCOUNTER — HOSPITAL ENCOUNTER (OUTPATIENT)
Dept: PHYSICAL THERAPY | Facility: REHABILITATION | Age: 71
End: 2021-09-16
Payer: COMMERCIAL

## 2021-09-16 DIAGNOSIS — G89.29 CHRONIC BILATERAL LOW BACK PAIN WITHOUT SCIATICA: Primary | ICD-10-CM

## 2021-09-16 DIAGNOSIS — M54.50 CHRONIC BILATERAL LOW BACK PAIN WITHOUT SCIATICA: Primary | ICD-10-CM

## 2021-09-16 DIAGNOSIS — M54.50 BILATERAL LOW BACK PAIN WITHOUT SCIATICA, UNSPECIFIED CHRONICITY: ICD-10-CM

## 2021-09-16 PROCEDURE — 97110 THERAPEUTIC EXERCISES: CPT | Mod: GP | Performed by: PHYSICAL THERAPIST

## 2021-09-16 NOTE — PROGRESS NOTES
Exercises  Date 9/16/2021 9/1/2021    Exercise     Supine Posterior pelvic tilting Reviewed, continue Ed to start 5x of each at home   Supine deep breathing Reviewed, continue Ed to start 5x of each at home   Supine shoulder flexion Reviewed, continue Ed to start 5x of each at home   Standing marches 5x each    Standing hip flexion SLR 5x each    Standing hip abduction 5x each    Standing knee flexion 5x each    Standing heel/toe raises 5x each    Mini squat 5x each    Shoulder ER with scap set Yellow ban tried, ed to discontinue band if too much on left shoulder. 5x

## 2021-10-06 NOTE — TELEPHONE ENCOUNTER
Patient has been reschedule 11/26 at [facility]   paperwork edit and sent to DANI christensen   instructions modify to reflect change  instructions have been sent to   Radha Blanco verbalized understanding of information given.  Anju Crawford MA 10/06/21    Clearance given to hold Eliquis   Please see tele encounter 8/5  Anju Crawford CMA October 6, 2021      Refill Approved    Rx renewed per Medication Renewal Policy. Medication was last renewed on 3/22/19.    Jacquelyn Norman, [facility] Triage/Med Refill 11/20/2019     Requested Prescriptions   Pending Prescriptions Disp Refills     hydroCHLOROthiazide (HYDRODIURIL) 25 MG tablet [Pharmacy Med Name: HYDROCHLOROTHIAZIDE 25MG TABLETS] 90 tablet 0     Sig: TAKE 1 TABLET(25 MG) BY MOUTH DAILY       Diuretics/Combination Diuretics Refill Protocol  Passed - 11/19/2019  7:35 PM        Passed - Visit with PCP or prescribing provider visit in past 12 months     Last office visit with prescriber/PCP: Visit date not found OR same dept: 2/14/2019 Tamia Sher FNP OR same specialty: 2/14/2019 Tamia Sher FNP  Last physical: Visit date not found Last MTM visit: Visit date not found   Next visit within 3 mo: Visit date not found  Next physical within 3 mo: Visit date not found  Prescriber OR PCP: Elder Rolle MD  Last diagnosis associated with med order: 1. Essential hypertension  - hydroCHLOROthiazide (HYDRODIURIL) 25 MG tablet [Pharmacy Med Name: HYDROCHLOROTHIAZIDE 25MG TABLETS]; TAKE 1 TABLET(25 MG) BY MOUTH DAILY  Dispense: 90 tablet; Refill: 0    If protocol passes may refill for 12 months if within 3 months of last provider visit (or a total of 15 months).             Passed - Serum Potassium in past 12 months      Lab Results   Component Value Date    Potassium 3.0 (L) 08/15/2019             Passed - Serum Sodium in past 12 months      Lab Results   Component Value Date    Sodium 139 08/15/2019             Passed - Blood pressure on file in past 12 months     BP Readings from Last 1 Encounters:   08/27/19 144/74             Passed - Serum Creatinine in past 12 months      Creatinine   Date Value Ref Range Status   08/15/2019 1.63 (H) 0.60 - 1.10 mg/dL Final

## 2021-10-11 ENCOUNTER — OFFICE VISIT (OUTPATIENT)
Dept: FAMILY MEDICINE | Facility: CLINIC | Age: 71
End: 2021-10-11
Payer: COMMERCIAL

## 2021-10-11 ENCOUNTER — HEALTH MAINTENANCE LETTER (OUTPATIENT)
Age: 71
End: 2021-10-11

## 2021-10-11 VITALS
HEIGHT: 65 IN | BODY MASS INDEX: 31.89 KG/M2 | WEIGHT: 191.38 LBS | OXYGEN SATURATION: 98 % | DIASTOLIC BLOOD PRESSURE: 80 MMHG | HEART RATE: 74 BPM | SYSTOLIC BLOOD PRESSURE: 126 MMHG

## 2021-10-11 DIAGNOSIS — E78.2 MIXED HYPERLIPIDEMIA: ICD-10-CM

## 2021-10-11 DIAGNOSIS — L82.1 SEBORRHEIC KERATOSIS: ICD-10-CM

## 2021-10-11 DIAGNOSIS — I10 ESSENTIAL HYPERTENSION: Primary | ICD-10-CM

## 2021-10-11 DIAGNOSIS — Z23 NEED FOR PROPHYLACTIC VACCINATION AND INOCULATION AGAINST INFLUENZA: ICD-10-CM

## 2021-10-11 DIAGNOSIS — Z23 HIGH PRIORITY FOR 2019-NCOV VACCINE: ICD-10-CM

## 2021-10-11 LAB
ANION GAP SERPL CALCULATED.3IONS-SCNC: 14 MMOL/L (ref 5–18)
BUN SERPL-MCNC: 26 MG/DL (ref 8–28)
CALCIUM SERPL-MCNC: 10.6 MG/DL (ref 8.5–10.5)
CHLORIDE BLD-SCNC: 101 MMOL/L (ref 98–107)
CHOLEST SERPL-MCNC: 168 MG/DL
CO2 SERPL-SCNC: 27 MMOL/L (ref 22–31)
CREAT SERPL-MCNC: 1.17 MG/DL (ref 0.6–1.1)
ERYTHROCYTE [DISTWIDTH] IN BLOOD BY AUTOMATED COUNT: 13.3 % (ref 10–15)
FASTING STATUS PATIENT QL REPORTED: NO
GFR SERPL CREATININE-BSD FRML MDRD: 46 ML/MIN/1.73M2
GLUCOSE BLD-MCNC: 93 MG/DL (ref 70–125)
HCT VFR BLD AUTO: 41.8 % (ref 35–47)
HDLC SERPL-MCNC: 66 MG/DL
HGB BLD-MCNC: 13.3 G/DL (ref 11.7–15.7)
LDLC SERPL CALC-MCNC: 82 MG/DL
MCH RBC QN AUTO: 30.4 PG (ref 26.5–33)
MCHC RBC AUTO-ENTMCNC: 31.8 G/DL (ref 31.5–36.5)
MCV RBC AUTO: 96 FL (ref 78–100)
PLATELET # BLD AUTO: 332 10E3/UL (ref 150–450)
POTASSIUM BLD-SCNC: 3.9 MMOL/L (ref 3.5–5)
RBC # BLD AUTO: 4.37 10E6/UL (ref 3.8–5.2)
SODIUM SERPL-SCNC: 142 MMOL/L (ref 136–145)
TRIGL SERPL-MCNC: 102 MG/DL
WBC # BLD AUTO: 7.9 10E3/UL (ref 4–11)

## 2021-10-11 PROCEDURE — 80048 BASIC METABOLIC PNL TOTAL CA: CPT | Performed by: FAMILY MEDICINE

## 2021-10-11 PROCEDURE — 36415 COLL VENOUS BLD VENIPUNCTURE: CPT | Performed by: FAMILY MEDICINE

## 2021-10-11 PROCEDURE — 90662 IIV NO PRSV INCREASED AG IM: CPT | Performed by: FAMILY MEDICINE

## 2021-10-11 PROCEDURE — 0004A COVID-19,PF,PFIZER (12+ YRS): CPT | Performed by: FAMILY MEDICINE

## 2021-10-11 PROCEDURE — 80061 LIPID PANEL: CPT | Performed by: FAMILY MEDICINE

## 2021-10-11 PROCEDURE — 99214 OFFICE O/P EST MOD 30 MIN: CPT | Mod: 25 | Performed by: FAMILY MEDICINE

## 2021-10-11 PROCEDURE — 85027 COMPLETE CBC AUTOMATED: CPT | Performed by: FAMILY MEDICINE

## 2021-10-11 PROCEDURE — G0008 ADMIN INFLUENZA VIRUS VAC: HCPCS | Performed by: FAMILY MEDICINE

## 2021-10-11 PROCEDURE — 91300 COVID-19,PF,PFIZER (12+ YRS): CPT | Performed by: FAMILY MEDICINE

## 2021-10-11 RX ORDER — FEXOFENADINE HCL 180 MG/1
180 TABLET ORAL DAILY
COMMUNITY

## 2021-10-11 RX ORDER — HYDROCHLOROTHIAZIDE 25 MG/1
25 TABLET ORAL DAILY
Qty: 90 TABLET | Refills: 1 | Status: SHIPPED | OUTPATIENT
Start: 2021-10-11

## 2021-10-11 ASSESSMENT — MIFFLIN-ST. JEOR: SCORE: 1373.95

## 2021-10-11 NOTE — PROGRESS NOTES
Assessment & Plan:       ICD-10-CM    1. Essential hypertension  I10 hydrochlorothiazide (HYDRODIURIL) 25 MG tablet     Basic metabolic panel     CBC with platelets     Basic metabolic panel     CBC with platelets   2. Mixed hyperlipidemia  E78.2 Lipid panel reflex to direct LDL Non-fasting     Lipid panel reflex to direct LDL Non-fasting   3. Seborrheic keratosis  L82.1    4. High priority for 2019-nCoV vaccine  Z23 COVID-19,PF,PFIZER   5. Need for prophylactic vaccination and inoculation against influenza  Z23 FLU VACCINE, INCREASED ANTIGEN, PRESV FREE, AGE 65+ [47901]     ADMIN INFLUENZA  (For MEDICARE Patients ONLY) []         Non-fasting labs were drawn. Blood pressure is under good control. We reviewed her current medications. Medication: continue current medication regimen unchanged pending additional results. We reviewed dietary recommendations, including low salt and high fiber diet, and recommendations for regular exercise/activity. As far as the skin lesion, we reviewed it's benign nature and discussed indications for re-evaluation. She will plan to follow up in 4-6 mos for repeat fasting labs and med check, sooner if any difficulties.        Subjective:     Fasting today? No  Hypertension & Hyperlipidemia      Mariela Jane is a 71 year old female here for follow-up of elevated blood pressure and hyperlipidemia. A non-fasting lipid profile was done. Compliance with treatment has been good. Patient denies muscle pain associated with her medications. She is currently taking atorvastatin and hydrochlorothyazide. Current side effects include: none. Associated signs and symptoms: none. Denies chest pain, dyspnea, palpitations, near-syncope, exertional chest pressure/discomfort, lower extremity edema. The patient reports minimal exercise. Weight trend: has been stable.              Previous history of cardiac disease includes: none.         She has a mole on her back that she is concerned about. She  "would like it looked at.      The following portions of the patient's history were reviewed and updated as appropriate: allergies, current medications, past family history, past medical history, past social history, past surgical history and problem list.    Review of Systems  12 point ROS negative except as noted above        Objective:      Vitals:    10/11/21 1358   BP: 126/80   Patient Position: Sitting   Cuff Size: Adult Regular   Pulse: 74   SpO2: 98%   Weight: 86.8 kg (191 lb 6 oz)   Height: 1.651 m (5' 5\")     GEN: Alert and oriented, NAD, well nourished  SKIN:  Normal skin turgor. Waxy brown lesion noted on the back consistent with a seborrheic keratosis.   HEENT: NC/AT, moist mucous membranes, no rhinorrhea.    NECK: Normal.  No adenopathy or thyromegaly.  CV: Regular rate and rhythm, no murmurs.   LUNGS: Clear to auscultation bilaterally.    ABDOMEN: Soft, non-tender, non-distended, no masses   BACK: Normal  EXTREMITY: No edema, cyanosis  NEURO: Grossly normal.         Labs:  Results for orders placed or performed in visit on 10/11/21   Basic metabolic panel     Status: Abnormal   Result Value Ref Range    Sodium 142 136 - 145 mmol/L    Potassium 3.9 3.5 - 5.0 mmol/L    Chloride 101 98 - 107 mmol/L    Carbon Dioxide (CO2) 27 22 - 31 mmol/L    Anion Gap 14 5 - 18 mmol/L    Urea Nitrogen 26 8 - 28 mg/dL    Creatinine 1.17 (H) 0.60 - 1.10 mg/dL    Calcium 10.6 (H) 8.5 - 10.5 mg/dL    Glucose 93 70 - 125 mg/dL    GFR Estimate 46 (L) >60 mL/min/1.73m2   CBC with platelets     Status: Normal   Result Value Ref Range    WBC Count 7.9 4.0 - 11.0 10e3/uL    RBC Count 4.37 3.80 - 5.20 10e6/uL    Hemoglobin 13.3 11.7 - 15.7 g/dL    Hematocrit 41.8 35.0 - 47.0 %    MCV 96 78 - 100 fL    MCH 30.4 26.5 - 33.0 pg    MCHC 31.8 31.5 - 36.5 g/dL    RDW 13.3 10.0 - 15.0 %    Platelet Count 332 150 - 450 10e3/uL   Lipid panel reflex to direct LDL Non-fasting     Status: None   Result Value Ref Range    Cholesterol 168 <=199 " mg/dL    Triglycerides 102 <=149 mg/dL    Direct Measure HDL 66 >=50 mg/dL    LDL Cholesterol Calculated 82 <=129 mg/dL    Patient Fasting > 8hrs? No

## 2021-10-11 NOTE — PATIENT INSTRUCTIONS
Iron Supplements  Food Sources of Iron  Iron is found in a few types of foods. Good sources include:    Red meat, poultry, fish, eggs    Dried fruits (especially raisins, prunes, figs)    Legumes such as dried beans and lentils    Breads and cereals with iron added    Blackstrap molasses    Spinach    Foods cooked in cast-iron pans. This is especially true of acidic foods, such as tomatoes and josé miguel     Most people think of iron as a metal that is used to make pots, frying pans, and soup kettles. This same metal (or mineral) also plays a vital role in the body. Iron helps the blood cells carry oxygen. When you don t get enough iron, you may feel tired and lack energy. Anemia ( tired  blood) is a health problem that can occur when the body s iron levels are very low. Our bodies don t make iron, so we must get it from foods or supplements.    Why Use a Supplement?  Women tend to need iron because of menstrual blood loss. But even grown men and boys may need a supplement at some point. You may need an iron supplement if you check off one or more of the following:    I rarely eat foods that are high in iron (such as red meat, poultry, dried beans, and foods with iron added).    I am a vegetarian and I rarely eat legumes (dried beans, peas, lentils).    I am a female who has heavy menstrual periods.    I am pregnant or breastfeeding.    I have been diagnosed with iron-deficiency anemia.    Recommended Iron Intake   Everyone has his or her own iron needs. But taking more than the suggested amount is not always healthy. Your health care provider can help you choose the best amount of iron for you.     If You Take Iron  Here are some tips to help you get the most from an iron supplement:    Take it with vitamin C for better absorption. Don t take an iron supplement with milk. The calcium in milk limits iron absorption.    Drink plenty of water, eat high-fiber foods, and exercise often to prevent constipation. Or use an  iron supplement with an added stool softener.    Eat a healthy diet to provide all the nutrients your body needs.      2958-8404 [employer]. [address] [place] [address], [place] [address]. All rights reserved. This information is not intended as a substitute for professional medical care. Always follow your healthcare professional's instructions.

## 2021-12-03 ENCOUNTER — LAB (OUTPATIENT)
Dept: INFUSION THERAPY | Facility: CLINIC | Age: 71
End: 2021-12-03
Attending: INTERNAL MEDICINE
Payer: COMMERCIAL

## 2021-12-03 DIAGNOSIS — E61.1 IRON DEFICIENCY: ICD-10-CM

## 2021-12-03 LAB
ERYTHROCYTE [DISTWIDTH] IN BLOOD BY AUTOMATED COUNT: 13.6 % (ref 10–15)
FERRITIN SERPL-MCNC: 28 NG/ML (ref 10–130)
HCT VFR BLD AUTO: 41.7 % (ref 35–47)
HGB BLD-MCNC: 13 G/DL (ref 11.7–15.7)
IRON SATN MFR SERPL: 36 % (ref 15–46)
IRON SERPL-MCNC: 146 UG/DL (ref 35–180)
MCH RBC QN AUTO: 29.4 PG (ref 26.5–33)
MCHC RBC AUTO-ENTMCNC: 31.2 G/DL (ref 31.5–36.5)
MCV RBC AUTO: 94 FL (ref 78–100)
PLATELET # BLD AUTO: 329 10E3/UL (ref 150–450)
RBC # BLD AUTO: 4.42 10E6/UL (ref 3.8–5.2)
TIBC SERPL-MCNC: 407 UG/DL (ref 240–430)
WBC # BLD AUTO: 7.6 10E3/UL (ref 4–11)

## 2021-12-03 PROCEDURE — 36415 COLL VENOUS BLD VENIPUNCTURE: CPT

## 2021-12-03 PROCEDURE — 85027 COMPLETE CBC AUTOMATED: CPT

## 2021-12-03 PROCEDURE — 82728 ASSAY OF FERRITIN: CPT

## 2021-12-03 PROCEDURE — 83550 IRON BINDING TEST: CPT

## 2021-12-08 NOTE — ADDENDUM NOTE
Encounter addended by: Yvonne Garay, PT on: 12/8/2021 12:36 PM   Actions taken: Episode resolved, Clinical Note Signed

## 2021-12-08 NOTE — PROGRESS NOTES
[facility] Rehabilitation Service    Outpatient Physical Therapy Discharge Note  Patient: Mariela Jane  : 1950    Beginning/End Dates of Reporting Period:  2021 to 2021    Referring Provider: Dr. Corbin    Therapy Diagnosis: Low Back Pain     Client Self Report: see daily doc flowsheet    Objective Measurements: see daily doc flowsheet    Goals: see daily doc flowsheet    Plan: see daily doc flowsheet    Discharge: yes, patient did not return for further PT.

## 2021-12-15 ENCOUNTER — TELEPHONE (OUTPATIENT)
Dept: ONCOLOGY | Facility: CLINIC | Age: 71
End: 2021-12-15
Payer: COMMERCIAL

## 2021-12-15 NOTE — TELEPHONE ENCOUNTER
Dr. Corbin reviewed lab results from 12/3/21.  Iron is adequate.  Remain on same dose of iron. Follow up as discussed in 1 year.     Called patient.  Patient verbalized.  She reports she is not taking iron but eating salads with spinach.  She will continue eating diet in iron rich foods. She will call if has questions, concerns prior to her 1 year follow-up/TIFFANY Chatman RN

## 2022-04-15 DIAGNOSIS — E78.5 HLD (HYPERLIPIDEMIA): ICD-10-CM

## 2022-04-17 RX ORDER — ATORVASTATIN CALCIUM 40 MG/1
TABLET, FILM COATED ORAL
Qty: 45 TABLET | Refills: 1 | Status: SHIPPED | OUTPATIENT
Start: 2022-04-17

## 2022-04-17 NOTE — TELEPHONE ENCOUNTER
"Last Written Prescription Date:  3/22/2021  Last Fill Quantity: 45,  # refills: 3   Last office visit provider:  10/11/2021     Requested Prescriptions   Pending Prescriptions Disp Refills     atorvastatin (LIPITOR) 40 MG tablet 45 tablet 3       Statins Protocol Passed - 4/15/2022 11:23 AM        Passed - LDL on file in past 12 months     Recent Labs   Lab Test 10/11/21  1509   LDL 82             Passed - No abnormal creatine kinase in past 12 months     No lab results found.             Passed - Recent (12 mo) or future (30 days) visit within the authorizing provider's specialty     Patient has had an office visit with the authorizing provider or a provider within the authorizing providers department within the previous 12 mos or has a future within next 30 days. See \"Patient Info\" tab in inbasket, or \"Choose Columns\" in Meds & Orders section of the refill encounter.              Passed - Medication is active on med list        Passed - Patient is age 18 or older        Passed - No active pregnancy on record        Passed - No positive pregnancy test in past 12 months             Anne Woodard RN 04/17/22 6:45 PM  "

## 2022-07-07 ENCOUNTER — TRANSFERRED RECORDS (OUTPATIENT)
Dept: HEALTH INFORMATION MANAGEMENT | Facility: CLINIC | Age: 72
End: 2022-07-07

## 2022-07-17 ENCOUNTER — HEALTH MAINTENANCE LETTER (OUTPATIENT)
Age: 72
End: 2022-07-17

## 2022-09-25 ENCOUNTER — HEALTH MAINTENANCE LETTER (OUTPATIENT)
Age: 72
End: 2022-09-25

## 2023-06-04 ENCOUNTER — HEALTH MAINTENANCE LETTER (OUTPATIENT)
Age: 73
End: 2023-06-04

## 2023-08-05 ENCOUNTER — HEALTH MAINTENANCE LETTER (OUTPATIENT)
Age: 73
End: 2023-08-05

## 2024-09-28 ENCOUNTER — HEALTH MAINTENANCE LETTER (OUTPATIENT)
Age: 74
End: 2024-09-28